# Patient Record
Sex: MALE | URBAN - METROPOLITAN AREA
[De-identification: names, ages, dates, MRNs, and addresses within clinical notes are randomized per-mention and may not be internally consistent; named-entity substitution may affect disease eponyms.]

---

## 2023-08-30 ENCOUNTER — LAB REQUISITION (OUTPATIENT)
Dept: LAB | Facility: CLINIC | Age: 88
End: 2023-08-30

## 2023-08-30 DIAGNOSIS — I48.0 PAROXYSMAL ATRIAL FIBRILLATION (H): ICD-10-CM

## 2023-08-31 LAB — INR PPP: 2.57 (ref 0.85–1.15)

## 2023-08-31 PROCEDURE — 36415 COLL VENOUS BLD VENIPUNCTURE: CPT | Performed by: INTERNAL MEDICINE

## 2023-08-31 PROCEDURE — P9604 ONE-WAY ALLOW PRORATED TRIP: HCPCS | Performed by: INTERNAL MEDICINE

## 2023-08-31 PROCEDURE — 85610 PROTHROMBIN TIME: CPT | Performed by: INTERNAL MEDICINE

## 2023-09-01 ENCOUNTER — LAB REQUISITION (OUTPATIENT)
Dept: LAB | Facility: CLINIC | Age: 88
End: 2023-09-01
Payer: MEDICARE

## 2023-09-01 DIAGNOSIS — I50.33 ACUTE ON CHRONIC DIASTOLIC (CONGESTIVE) HEART FAILURE (H): ICD-10-CM

## 2023-09-02 LAB
ANION GAP SERPL CALCULATED.3IONS-SCNC: 7 MMOL/L (ref 7–15)
BUN SERPL-MCNC: 28.7 MG/DL (ref 8–23)
CALCIUM SERPL-MCNC: 9.1 MG/DL (ref 8.8–10.2)
CHLORIDE SERPL-SCNC: 101 MMOL/L (ref 98–107)
CREAT SERPL-MCNC: 1.2 MG/DL (ref 0.67–1.17)
DEPRECATED HCO3 PLAS-SCNC: 31 MMOL/L (ref 22–29)
GFR SERPL CREATININE-BSD FRML MDRD: 58 ML/MIN/1.73M2
GLUCOSE SERPL-MCNC: 87 MG/DL (ref 70–99)
POTASSIUM SERPL-SCNC: 4.5 MMOL/L (ref 3.4–5.3)
SODIUM SERPL-SCNC: 139 MMOL/L (ref 136–145)

## 2023-09-02 PROCEDURE — P9604 ONE-WAY ALLOW PRORATED TRIP: HCPCS | Performed by: INTERNAL MEDICINE

## 2023-09-02 PROCEDURE — 80048 BASIC METABOLIC PNL TOTAL CA: CPT | Performed by: INTERNAL MEDICINE

## 2023-09-03 ENCOUNTER — LAB REQUISITION (OUTPATIENT)
Dept: LAB | Facility: CLINIC | Age: 88
End: 2023-09-03
Payer: MEDICARE

## 2023-09-03 DIAGNOSIS — I48.0 PAROXYSMAL ATRIAL FIBRILLATION (H): ICD-10-CM

## 2023-09-04 ENCOUNTER — TELEPHONE (OUTPATIENT)
Dept: GERIATRICS | Facility: CLINIC | Age: 88
End: 2023-09-04
Payer: MEDICARE

## 2023-09-04 ENCOUNTER — LAB REQUISITION (OUTPATIENT)
Dept: LAB | Facility: CLINIC | Age: 88
End: 2023-09-04

## 2023-09-04 DIAGNOSIS — R30.9 PAINFUL MICTURITION, UNSPECIFIED: ICD-10-CM

## 2023-09-04 DIAGNOSIS — R35.0 FREQUENCY OF MICTURITION: ICD-10-CM

## 2023-09-04 LAB
ALBUMIN UR-MCNC: 50 MG/DL
APPEARANCE UR: ABNORMAL
BACTERIA #/AREA URNS HPF: ABNORMAL /HPF
BILIRUB UR QL STRIP: NEGATIVE
CAOX CRY #/AREA URNS HPF: ABNORMAL /HPF
COLOR UR AUTO: YELLOW
GLUCOSE UR STRIP-MCNC: NEGATIVE MG/DL
HGB UR QL STRIP: ABNORMAL
HYALINE CASTS: 2 /LPF
KETONES UR STRIP-MCNC: NEGATIVE MG/DL
LEUKOCYTE ESTERASE UR QL STRIP: ABNORMAL
NITRATE UR QL: NEGATIVE
PH UR STRIP: 5 [PH] (ref 5–7)
RBC URINE: 109 /HPF
SP GR UR STRIP: 1.03 (ref 1–1.03)
UROBILINOGEN UR STRIP-MCNC: 2 MG/DL
WBC CLUMPS #/AREA URNS HPF: PRESENT /HPF
WBC URINE: >182 /HPF

## 2023-09-04 PROCEDURE — 81001 URINALYSIS AUTO W/SCOPE: CPT

## 2023-09-04 PROCEDURE — 87186 SC STD MICRODIL/AGAR DIL: CPT

## 2023-09-04 NOTE — TELEPHONE ENCOUNTER
Nursing called for couple of reasons:     Weight gain up >3 lbs.  Ok to give another Lasix 10mg with the second 10mg order of Lasix for the day given this afternoon.  No other changes.  2.  Complaining of burning with urination.  Ok to collect a UA/UC      DEONNA Burden CNP

## 2023-09-05 ENCOUNTER — TRANSITIONAL CARE UNIT VISIT (OUTPATIENT)
Dept: GERIATRICS | Facility: CLINIC | Age: 88
End: 2023-09-05
Payer: MEDICARE

## 2023-09-05 ENCOUNTER — LAB REQUISITION (OUTPATIENT)
Dept: LAB | Facility: CLINIC | Age: 88
End: 2023-09-05
Payer: MEDICARE

## 2023-09-05 VITALS
HEART RATE: 61 BPM | TEMPERATURE: 98.9 F | SYSTOLIC BLOOD PRESSURE: 150 MMHG | HEIGHT: 71 IN | OXYGEN SATURATION: 91 % | DIASTOLIC BLOOD PRESSURE: 84 MMHG | RESPIRATION RATE: 20 BRPM | WEIGHT: 183 LBS | BODY MASS INDEX: 25.62 KG/M2

## 2023-09-05 DIAGNOSIS — S12.100D CLOSED ODONTOID FRACTURE WITH ROUTINE HEALING, SUBSEQUENT ENCOUNTER: ICD-10-CM

## 2023-09-05 DIAGNOSIS — E05.90 HYPERTHYROIDISM: ICD-10-CM

## 2023-09-05 DIAGNOSIS — J96.11 CHRONIC RESPIRATORY FAILURE WITH HYPOXIA (H): ICD-10-CM

## 2023-09-05 DIAGNOSIS — I50.9 ACUTE CONGESTIVE HEART FAILURE, UNSPECIFIED HEART FAILURE TYPE (H): Primary | ICD-10-CM

## 2023-09-05 DIAGNOSIS — I48.0 PAROXYSMAL ATRIAL FIBRILLATION (H): ICD-10-CM

## 2023-09-05 DIAGNOSIS — I10 ESSENTIAL HYPERTENSION: ICD-10-CM

## 2023-09-05 LAB — INR PPP: 3.79 (ref 0.85–1.15)

## 2023-09-05 PROCEDURE — 85610 PROTHROMBIN TIME: CPT | Performed by: FAMILY MEDICINE

## 2023-09-05 PROCEDURE — P9604 ONE-WAY ALLOW PRORATED TRIP: HCPCS | Performed by: FAMILY MEDICINE

## 2023-09-05 PROCEDURE — 99305 1ST NF CARE MODERATE MDM 35: CPT | Performed by: FAMILY MEDICINE

## 2023-09-05 PROCEDURE — 36415 COLL VENOUS BLD VENIPUNCTURE: CPT | Performed by: FAMILY MEDICINE

## 2023-09-05 NOTE — LETTER
9/5/2023        RE: Zachery Jimenez  [address] [address] [address]          [facility] GERIATRICS  [place] Medical Record Number:  [id number]  Place of Service where encounter took place: [facility] ([facility]) [[id number]]  CODE STATUS:   DNR only    Chief Complaint/Reason for Visit:  Chief Complaint   Patient presents with     Hospital F/U     MD note to TCU-CHF exacerbation. Chronic home O2. Recent fall with odontoid fracture, non operative. Afib on coumadin.        HPI:    Zachery Jimenez is a 88 year old male with hx of CHF, chronic resp failure on home O2, afib on coumadin, anxiety, admitted to the hospital on 8/24/2023 with fluid overload and CHF exac following prior hospitalization after a fall with odontoid Fx, non operative.     HOSPITALIST DISCHARGE SUMMARY   [facility]   Admission Date: 8/24/2023  Discharge Date: 8/30/2023    Hospital Course   Mr. Zachery Jimenez is a 88 y.o. male with a history of paroxysmal atrial fibrillation chronically anticoagulated with Coumadin, pulm HTN, on 5L home O2 at baseline, obstructive sleep apnea, tachybradycardia syndrome status post pacemaker placement admitted with acute on chronic diastolic CHF exacerbation.    Fluid overload  Hypoxia  Acute on chronic diastolic CHF  Chronic pulmonary hypertension   Restrictive lung disease due to kyphosis  Presented with hypoxia/shortness of breath.   In ED found to have proBNP 6982,   Chest x-ray with pulmonary vascular congestion/interstitial pulmonary opacities.   Received 80 mg intravenous Lasix with a 1 L urine output.  Patient is currently living at home, receiving home health care, family staying 24/7, past 2 to 3 days noted to be a little more short of breath, possibly some lightheadedness.     He was initially started on IV lasix 20 mg IV every 8 hours. Low blood pressure levels with the diuresis and we held the lasix IV and oral.  Good UOP but low blood pressure levels  8/25/2023.  Pro BNP 8/26/2023 showed improvement.  Discharged on Lasix 10 mg twice daily.  Cr stable in 1.1-1.2 range.  Dry wt around 81 kg.    Acute on chronic hypoxia  Due to the CHF plus pulmonary hypertension and chronic lung disease  Note: Patient was admitted to [facility] from 8/17-8/19 after a fall, found to have type II odontoid fracture along with a chronic appearing 20% C7 compression fracture. He was diuresed for acute pulmonary edema/fluid overload, was sent home with a Lasix 20 mg daily. He had episodes of delirium likely related to narcotics. Became dysphoric, disoriented, agitated with Dilaudid. Daughter had concerns about use of narcotics for pain control/sedatives/confusion in the hospital.  Weaned karla to usual home O2 regimen (up to 6L).    Atrial fibrillation  We continued the coumadin     Overall stabilized and discharged to TCU on 8/30/2023 for PT, OT, nursing cares, medical management and monitoring.     Today:  Wearing hard Aspen C collar, will need follow up with neurosurgery. Has chronic O2 as at home, reports breathing at baseline. No chest pain or dizziness. On lasix 10 mg BID. Has other cardiac meds, isosorbide, metoprolol, amiodarone. Anticoagulated with coumadin for Afib, adequate rate control. Has follow up appt in CHF clinic in October. Reports pretty good appetite. No diarrhea or constipation. No nausea or vomiting. Hx BPH on finasteride, no new urinary sx. Lives at home with a son who was born with hydrocephalus. Wife passed away about 3 weeks ago. Has other kids nearby.       REVIEW OF SYSTEMS:  All others negative other than those noted in HPI.      PAST MEDICAL HISTORY:  Past Medical History:   Diagnosis Date     Afib (H)      Anxiety      Congestive heart failure (H)      Hyperlipidemia      Hypertension      Peripheral autonomic neuropathy in disorders classified elsewhere        PAST SURGICAL HISTORY:  Past Surgical History:   Procedure Laterality Date     CATARACT  EXTRACTION Bilateral        FAMILY HISTORY:  Family History   Problem Relation Age of Onset     Heart Disease Mother      Heart Disease Father      Diabetes Brother        SOCIAL HISTORY:  Social History     Socioeconomic History     Marital status:      Spouse name: Not on file     Number of children: Not on file     Years of education: Not on file     Highest education level: Not on file   Occupational History     Not on file   Tobacco Use     Smoking status: Not on file     Smokeless tobacco: Not on file   Substance and Sexual Activity     Alcohol use: Not on file     Drug use: Not on file     Sexual activity: Not on file   Other Topics Concern     Not on file   Social History Narrative     Not on file     Social Determinants of Health     Financial Resource Strain: Not on file   Food Insecurity: Not on file   Transportation Needs: Not on file   Physical Activity: Not on file   Stress: Not on file   Social Connections: Not on file   Intimate Partner Violence: Not on file   Housing Stability: Not on file       MEDICATIONS:  Current Outpatient Medications   Medication Sig Dispense Refill     acetaminophen (TYLENOL) 500 MG tablet Take 1,000 mg by mouth every 8 hours       amiodarone (PACERONE) 200 MG tablet Take 200 mg by mouth daily       atorvastatin (LIPITOR) 20 MG tablet Take 20 mg by mouth daily       docusate sodium (COLACE) 100 MG capsule Take 100 mg by mouth daily       finasteride (PROSCAR) 5 MG tablet Take 5 mg by mouth daily       furosemide (LASIX) 20 MG tablet Take 10 mg by mouth 2 times daily       hydrOXYzine (ATARAX) 10 MG tablet Take 10 mg by mouth every 6 hours as needed       isosorbide mononitrate (IMDUR) 30 MG 24 hr tablet Take 30 mg by mouth daily       Lidocaine (LIDOCARE) 4 % Patch Apply to intact skin to cover most painful area for max 12hr per 24hr period.       methimazole (TAPAZOLE) 5 MG tablet Take 1 tablet by mouth daily       metoprolol succinate ER (TOPROL XL) 50 MG 24 hr  "tablet Take 25 mg by mouth daily       multiple vitamin  tablet TABS Take 1 tablet by mouth 2 times daily       nitroGLYcerin (NITROSTAT) 0.4 MG sublingual tablet Place 0.4 mg under the tongue       polyethylene glycol (MIRALAX) 17 g packet 17 g by Nasogastric route       sertraline (ZOLOFT) 50 MG tablet Take 1 tablet by mouth daily       warfarin ANTICOAGULANT (COUMADIN) 2.5 MG tablet          ALLERGIES:  Allergies   Allergen Reactions     Beta Adrenergic Blockers Other (See Comments)     ALPHA 2 ADRENERGIC AGONIST        PHYSICAL EXAM:  General: Patient is alert male, no distress. On O2. [place].   Vitals: BP (!) 150/84   Pulse 61   Temp 98.9  F (37.2  C)   Resp 20   Ht 1.803 m (5' 11\")   Wt 83 kg (183 lb)   SpO2 91%   BMI 25.52 kg/m    HEENT: Head is NCAT. Eyes show no injection or icterus. Nares negative. Oropharynx well hydrated.  Neck: Aspen collar.   Lungs: Diminished at bases. No wheezes.  Cardiovascular: Regular rate and rhythm, normal S1. S2.  Back: No spinal or CVA tenderness.  Abdomen: Soft, no tenderness on exam. Bowel sounds present. No guarding rebound or rigidity.  : Deferred.  Extremities: Trace edema is noted.  Musculoskeletal: Age related degen changes.   Skin: No rases.   Psych: Mood appears pretty good.      LABS/DIAGNOSTIC DATA:  Ref Range & Units  8/24/2023    SODIUM 136 - 145 mmol/L 138   POTASSIUM 3.5 - 5.1 mmol/L 5.0   CHLORIDE 98 - 107 mmol/L 101   CO2,TOTAL 22 - 29 mmol/L 26   ANION GAP 5 - 18 11   GLUCOSE 70 - 99 mg/dL 102 High    CALCIUM 8.8 - 10.2 mg/dL 8.9   BUN 8 - 23 mg/dL 26 High    CREATININE 0.70 - 1.20 mg/dL 1.16   BUN/CREAT RATIO 10 - 20 22 High    eGFR >90 mL/min/1.73m2 61 Low      Ref Range & Units  8/24/2023    WHITE BLOOD COUNT         4.5 - 11.0 thou/cu mm 6.5   RED BLOOD COUNT           4.30 - 5.90 mil/cu mm 4.01 Low    HEMOGLOBIN               13.5 - 17.5 g/dL 13.2 Low    HEMATOCRIT               37.0 - 53.0 % 41.2   MCV                       80 - 100 fL 103 High  "   MCH                       26.0 - 34.0 pg 32.9   MCHC                     32.0 - 36.0 g/dL 32.0   RDW                       11.5 - 15.5 % 15.8 High    PLATELET COUNT           140 - 440 thou/cu mm 203   MPV                       6.5 - 11.0 fL 9.9   NRBC                     % 0.0   ABS NRBC thou /cu mm 0.0       ASSESSMENT/PLAN:  CHF. Acute on chronic. Diuresed in the hospital, continuing on PO lasix. Also on isosorbide. Monitor weights, edema, clinical status. Follow up in CHF clinic in October.  Chronic resp failure. CHF plus pulm HTN, ASHLIE and restrictive lung disease. On home oxygen up to 6 LPM, continue.  Afib. Anticoagulated with coumadin. On amiodarone and metoprolol for rate control.  Odontoid fracture. Sustained in a fall approx 8/15/2023, seen by neurosurgery, non operative. Follow up in neurosurgeon office. Continue Aspen collar. Pain management adequate, has scheduled tylenol.   HTN. On metoprolol and lasix. Monitor Bps in TCU.   Hyperthyroidism. He is on methimazole.  Anxiety. Continue PTA Sertraline.   BPH. On finasteride.   Code status is DNR.         Electronically signed by: Millie Mejia MD               Sincerely,        Millie Mejia MD

## 2023-09-06 ENCOUNTER — LAB REQUISITION (OUTPATIENT)
Dept: LAB | Facility: CLINIC | Age: 88
End: 2023-09-06
Payer: MEDICARE

## 2023-09-06 DIAGNOSIS — I48.0 PAROXYSMAL ATRIAL FIBRILLATION (H): ICD-10-CM

## 2023-09-06 LAB
BACTERIA UR CULT: ABNORMAL
INR PPP: 3.05 (ref 0.85–1.15)

## 2023-09-06 PROCEDURE — 36415 COLL VENOUS BLD VENIPUNCTURE: CPT | Performed by: FAMILY MEDICINE

## 2023-09-06 PROCEDURE — 85610 PROTHROMBIN TIME: CPT | Performed by: FAMILY MEDICINE

## 2023-09-06 PROCEDURE — P9604 ONE-WAY ALLOW PRORATED TRIP: HCPCS | Performed by: FAMILY MEDICINE

## 2023-09-06 RX ORDER — POLYETHYLENE GLYCOL 3350 17 G/17G
17 POWDER, FOR SOLUTION ORAL
COMMUNITY
Start: 2023-08-19

## 2023-09-06 RX ORDER — WARFARIN SODIUM 2.5 MG/1
TABLET ORAL
COMMUNITY
Start: 2023-06-05

## 2023-09-06 RX ORDER — METOPROLOL SUCCINATE 50 MG/1
25 TABLET, EXTENDED RELEASE ORAL DAILY
COMMUNITY
Start: 2023-03-28

## 2023-09-06 RX ORDER — MULTIVITAMIN
1 TABLET ORAL 2 TIMES DAILY
COMMUNITY

## 2023-09-06 RX ORDER — ATORVASTATIN CALCIUM 20 MG/1
20 TABLET, FILM COATED ORAL DAILY
COMMUNITY
Start: 2023-01-30

## 2023-09-06 RX ORDER — LIDOCAINE 4 G/G
PATCH TOPICAL
COMMUNITY
Start: 2023-08-20

## 2023-09-06 RX ORDER — METHIMAZOLE 5 MG/1
1 TABLET ORAL DAILY
COMMUNITY
Start: 2023-06-30

## 2023-09-06 RX ORDER — ACETAMINOPHEN 500 MG
1000 TABLET ORAL EVERY 8 HOURS
COMMUNITY

## 2023-09-06 RX ORDER — NITROGLYCERIN 0.4 MG/1
0.4 TABLET SUBLINGUAL
COMMUNITY
Start: 2023-08-02

## 2023-09-06 RX ORDER — FUROSEMIDE 20 MG
10 TABLET ORAL 2 TIMES DAILY
COMMUNITY

## 2023-09-06 RX ORDER — AMIODARONE HYDROCHLORIDE 200 MG/1
200 TABLET ORAL DAILY
COMMUNITY
Start: 2023-05-02

## 2023-09-06 RX ORDER — ISOSORBIDE MONONITRATE 30 MG/1
30 TABLET, EXTENDED RELEASE ORAL DAILY
COMMUNITY
Start: 2023-08-30

## 2023-09-06 RX ORDER — FINASTERIDE 5 MG/1
5 TABLET, FILM COATED ORAL DAILY
COMMUNITY
Start: 2023-02-25

## 2023-09-06 RX ORDER — DOCUSATE SODIUM 100 MG/1
100 CAPSULE, LIQUID FILLED ORAL DAILY
COMMUNITY

## 2023-09-06 RX ORDER — HYDROXYZINE HYDROCHLORIDE 10 MG/1
10 TABLET, FILM COATED ORAL EVERY 6 HOURS PRN
COMMUNITY
Start: 2023-08-02 | End: 2023-09-20

## 2023-09-07 ENCOUNTER — TELEPHONE (OUTPATIENT)
Dept: GERIATRICS | Facility: CLINIC | Age: 88
End: 2023-09-07

## 2023-09-07 ENCOUNTER — TRANSITIONAL CARE UNIT VISIT (OUTPATIENT)
Dept: GERIATRICS | Facility: CLINIC | Age: 88
End: 2023-09-07
Payer: MEDICARE

## 2023-09-07 VITALS
SYSTOLIC BLOOD PRESSURE: 101 MMHG | WEIGHT: 184.5 LBS | OXYGEN SATURATION: 92 % | HEIGHT: 71 IN | HEART RATE: 71 BPM | RESPIRATION RATE: 18 BRPM | BODY MASS INDEX: 25.83 KG/M2 | TEMPERATURE: 98.2 F | DIASTOLIC BLOOD PRESSURE: 47 MMHG

## 2023-09-07 DIAGNOSIS — I50.33 ACUTE ON CHRONIC HEART FAILURE WITH PRESERVED EJECTION FRACTION (HFPEF) (H): Primary | ICD-10-CM

## 2023-09-07 DIAGNOSIS — J96.21 ACUTE ON CHRONIC RESPIRATORY FAILURE WITH HYPOXIA (H): ICD-10-CM

## 2023-09-07 DIAGNOSIS — I48.0 PAROXYSMAL ATRIAL FIBRILLATION (H): ICD-10-CM

## 2023-09-07 DIAGNOSIS — I10 ESSENTIAL HYPERTENSION: ICD-10-CM

## 2023-09-07 DIAGNOSIS — S12.100D CLOSED ODONTOID FRACTURE WITH ROUTINE HEALING, SUBSEQUENT ENCOUNTER: ICD-10-CM

## 2023-09-07 LAB — INR PPP: 3.23 (ref 0.85–1.15)

## 2023-09-07 PROCEDURE — P9604 ONE-WAY ALLOW PRORATED TRIP: HCPCS | Performed by: NURSE PRACTITIONER

## 2023-09-07 PROCEDURE — 85610 PROTHROMBIN TIME: CPT | Performed by: NURSE PRACTITIONER

## 2023-09-07 PROCEDURE — 99309 SBSQ NF CARE MODERATE MDM 30: CPT | Performed by: FAMILY MEDICINE

## 2023-09-07 PROCEDURE — 36415 COLL VENOUS BLD VENIPUNCTURE: CPT | Performed by: NURSE PRACTITIONER

## 2023-09-07 NOTE — TELEPHONE ENCOUNTER
[facility] Geriatrics Triage Nurse INR     Provider: NORI Rodriguez  Facility: [facility]  Facility Type:  TCU    Caller: Belén  Call Back Number: [phone]  Reason for call: INR  Diagnosis/Goal: A. Fib    Todays INR: 3.23  Last INR 9/6  3.05  1mg  9/5  3.79  held  8/31  2.57  1.25mg F and 2.5mg AOD.     Heparin/Lovenox:  No  Currently on ABX?: Yes; please explain: Keflex 9/13  Other interacting medication:  None  Missed or refused doses: No    Verbal Order/Direction given by Provider:   0.5mg tonight, 1mg aod. recheck Monday 9/11    Provider Giving Order:  NORI Rodriguez    Verbal Order given to: Belén Sommer RN

## 2023-09-07 NOTE — LETTER
9/7/2023        RE: Zachery Jimenez  [address] [address] [address]          [facility] GERIATRICS  [place] Medical Record Number:  [id number]  Place of Service where encounter took place: [facility] ([facility]) [[id number]]  CODE STATUS:   DNR only    Chief Complaint/Reason for Visit:  Chief Complaint   Patient presents with     RECHECK     TCU 9/7/2023. CHF exacerbation. Chronic home O2. Recent fall with odontoid fracture, non operative. Afib on coumadin.        TCU HPI:    Zachery Jimenez is a 88 year old male with hx of CHF, chronic resp failure on home O2, afib on coumadin, anxiety, admitted to the hospital on 8/24/2023 with fluid overload and CHF exac following prior hospitalization after a fall with odontoid Fx, non operative.     HOSPITALIST DISCHARGE SUMMARY   [facility]   Admission Date: 8/24/2023  Discharge Date: 8/30/2023    Hospital Course   Mr. Zachery Jimenez is a 88 y.o. male with a history of paroxysmal atrial fibrillation chronically anticoagulated with Coumadin, pulm HTN, on 5L home O2 at baseline, obstructive sleep apnea, tachybradycardia syndrome status post pacemaker placement admitted with acute on chronic diastolic CHF exacerbation.    Fluid overload  Hypoxia  Acute on chronic diastolic CHF  Chronic pulmonary hypertension   Restrictive lung disease due to kyphosis  Presented with hypoxia/shortness of breath.   In ED found to have proBNP 6982,   Chest x-ray with pulmonary vascular congestion/interstitial pulmonary opacities.   Received 80 mg intravenous Lasix with a 1 L urine output.  Patient is currently living at home, receiving home health care, family staying 24/7, past 2 to 3 days noted to be a little more short of breath, possibly some lightheadedness.     He was initially started on IV lasix 20 mg IV every 8 hours. Low blood pressure levels with the diuresis and we held the lasix IV and oral.  Good UOP but low blood pressure levels  8/25/2023.  Pro BNP 8/26/2023 showed improvement.  Discharged on Lasix 10 mg twice daily.  Cr stable in 1.1-1.2 range.  Dry wt around 81 kg.    Acute on chronic hypoxia  Due to the CHF plus pulmonary hypertension and chronic lung disease  Note: Patient was admitted to [facility] from 8/17-8/19 after a fall, found to have type II odontoid fracture along with a chronic appearing 20% C7 compression fracture. He was diuresed for acute pulmonary edema/fluid overload, was sent home with a Lasix 20 mg daily. He had episodes of delirium likely related to narcotics. Became dysphoric, disoriented, agitated with Dilaudid. Daughter had concerns about use of narcotics for pain control/sedatives/confusion in the hospital.  Weaned karla to usual home O2 regimen (up to 6L).    Atrial fibrillation  We continued the coumadin     Overall stabilized and discharged to TCU on 8/30/2023 for PT, OT, nursing cares, medical management and monitoring.     Today:  Wearing hard Aspen C collar, needs follow up with neurosurgery. States no pain in neck currently. Reports breathing is at baseline, chronic O2 per NC. No chest pain or dizziness. On lasix 10 mg BID. Has other cardiac meds, isosorbide, metoprolol, amiodarone. Anticoagulated with coumadin for Afib, adequate rate control. Has follow up appt in CHF clinic in October. Appetite is good. No diarrhea or constipation. No nausea or vomiting. Hx BPH on finasteride.      PAST MEDICAL HISTORY:  Past Medical History:   Diagnosis Date     Afib (H)      Anxiety      Congestive heart failure (H)      Hyperlipidemia      Hypertension      Peripheral autonomic neuropathy in disorders classified elsewhere        MEDICATIONS:  Current Outpatient Medications   Medication Sig Dispense Refill     acetaminophen (TYLENOL) 500 MG tablet Take 1,000 mg by mouth every 8 hours       amiodarone (PACERONE) 200 MG tablet Take 200 mg by mouth daily       atorvastatin (LIPITOR) 20 MG tablet Take 20 mg by mouth daily   "     docusate sodium (COLACE) 100 MG capsule Take 100 mg by mouth daily       finasteride (PROSCAR) 5 MG tablet Take 5 mg by mouth daily       furosemide (LASIX) 20 MG tablet Take 10 mg by mouth 2 times daily       hydrOXYzine (ATARAX) 10 MG tablet Take 10 mg by mouth every 6 hours as needed       isosorbide mononitrate (IMDUR) 30 MG 24 hr tablet Take 30 mg by mouth daily       Lidocaine (LIDOCARE) 4 % Patch Apply to intact skin to cover most painful area for max 12hr per 24hr period.       methimazole (TAPAZOLE) 5 MG tablet Take 1 tablet by mouth daily       metoprolol succinate ER (TOPROL XL) 50 MG 24 hr tablet Take 25 mg by mouth daily       multiple vitamin  tablet TABS Take 1 tablet by mouth 2 times daily       nitroGLYcerin (NITROSTAT) 0.4 MG sublingual tablet Place 0.4 mg under the tongue       polyethylene glycol (MIRALAX) 17 g packet 17 g by Nasogastric route       sertraline (ZOLOFT) 50 MG tablet Take 1 tablet by mouth daily       warfarin ANTICOAGULANT (COUMADIN) 2.5 MG tablet          PHYSICAL EXAM:  General: Patient is alert male, no distress. On O2. [place].   Vitals: /47   Pulse 71   Temp 98.2  F (36.8  C)   Resp 18   Ht 1.803 m (5' 11\")   Wt 83.7 kg (184 lb 8 oz)   SpO2 92%   BMI 25.73 kg/m    HEENT: Head is NCAT. Eyes show no injection or icterus. Nares negative. Oropharynx well hydrated.  Neck: Aspen collar.   Lungs: Nonlabored respirations.   : Deferred.  Extremities: Minimal edema.  Musculoskeletal: Age related degen changes.   Skin: Warm and dry.   Psych: Mood is good.         LABS/DIAGNOSTIC DATA:  Ref Range & Units  8/24/2023    SODIUM 136 - 145 mmol/L 138   POTASSIUM 3.5 - 5.1 mmol/L 5.0   CHLORIDE 98 - 107 mmol/L 101   CO2,TOTAL 22 - 29 mmol/L 26   ANION GAP 5 - 18 11   GLUCOSE 70 - 99 mg/dL 102 High    CALCIUM 8.8 - 10.2 mg/dL 8.9   BUN 8 - 23 mg/dL 26 High    CREATININE 0.70 - 1.20 mg/dL 1.16   BUN/CREAT RATIO 10 - 20 22 High    eGFR >90 mL/min/1.73m2 61 Low      Ref Range & " Units  8/24/2023    WHITE BLOOD COUNT         4.5 - 11.0 thou/cu mm 6.5   RED BLOOD COUNT           4.30 - 5.90 mil/cu mm 4.01 Low    HEMOGLOBIN               13.5 - 17.5 g/dL 13.2 Low    HEMATOCRIT               37.0 - 53.0 % 41.2   MCV                       80 - 100 fL 103 High    MCH                       26.0 - 34.0 pg 32.9   MCHC                     32.0 - 36.0 g/dL 32.0   RDW                       11.5 - 15.5 % 15.8 High    PLATELET COUNT           140 - 440 thou/cu mm 203   MPV                       6.5 - 11.0 fL 9.9   NRBC                     % 0.0   ABS NRBC thou /cu mm 0.0       ASSESSMENT/PLAN:  CHF. Acute on chronic. Diuresed in the hospital, continuing on PO lasix. Also on isosorbide. Monitor weights, edema, clinical status. Follow up in CHF clinic in October.  Chronic resp failure. CHF plus pulm HTN, ASHLIE and restrictive lung disease. On home oxygen up to 6 LPM, continue. Resp status at baseline.   Afib. Anticoagulated with coumadin. On amiodarone and metoprolol for rate control.  Odontoid fracture. Sustained in a fall approx 8/15/2023, seen by neurosurgery, non operative. Follow up in neurosurgeon office. Continue Aspen collar.   HTN. On metoprolol and lasix. Monitor Bps in TCU.        Electronically signed by: Millie Mejia MD             Sincerely,        Millie Mejia MD

## 2023-09-10 ENCOUNTER — LAB REQUISITION (OUTPATIENT)
Dept: LAB | Facility: CLINIC | Age: 88
End: 2023-09-10
Payer: MEDICARE

## 2023-09-10 DIAGNOSIS — I48.0 PAROXYSMAL ATRIAL FIBRILLATION (H): ICD-10-CM

## 2023-09-11 ENCOUNTER — TRANSITIONAL CARE UNIT VISIT (OUTPATIENT)
Dept: GERIATRICS | Facility: CLINIC | Age: 88
End: 2023-09-11
Payer: MEDICARE

## 2023-09-11 DIAGNOSIS — I50.32 CHRONIC DIASTOLIC CHF (CONGESTIVE HEART FAILURE) (H): Primary | ICD-10-CM

## 2023-09-11 DIAGNOSIS — S12.100S CLOSED ODONTOID FRACTURE, SEQUELA: ICD-10-CM
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
## 2023-09-11 LAB — INR PPP: 1.9 (ref 0.85–1.15)

## 2023-09-11 PROCEDURE — P9604 ONE-WAY ALLOW PRORATED TRIP: HCPCS | Performed by: NURSE PRACTITIONER

## 2023-09-11 PROCEDURE — 85610 PROTHROMBIN TIME: CPT | Performed by: NURSE PRACTITIONER

## 2023-09-11 PROCEDURE — 99309 SBSQ NF CARE MODERATE MDM 30: CPT | Performed by: NURSE PRACTITIONER

## 2023-09-11 PROCEDURE — 36415 COLL VENOUS BLD VENIPUNCTURE: CPT | Performed by: NURSE PRACTITIONER

## 2023-09-11 NOTE — LETTER
"    9/11/2023        RE: Zachery Jimenez  [address] [address] [address]        [facility] GERIATRICS    PRIMARY CARE PROVIDER AND CLINIC:  Provider Outside, No address on file  Chief Complaint   Patient presents with     Hospital F/U      [place] Medical Record Number:  [id number]  Place of Service where encounter took place:  [facility] ([facility]) [[id number]]    Zachery Jimenez  is a 88 year old  (11/18/1934)  HPI:  PMH of afib on coumadin, pulm htn on 5L o2, ASHLIE, pacemaker insitu, chronic HFrEF who was hospitalized 8/24-8/40 at [facility] for acute CHF exacerbation, fluid overload and hypoxia. He was started on IV lasix with improvement in fluid status. He was also hospitalized in early August for similar symptoms and sent home. At that time he had had a fall with odontoid fx and c7 compression fx and is now in aspen c collar for 12 weeks.        Today:  He is seen today per request due to 10lb weight gain since entering TCU. Reported \"dry weight\" is around 178lbs per hospital. He is now 187. He has no LE edema, no increased abdominal girth, and his SOB/HORN is improved as compared to when he arrived at the TCU. He is on his baseline 5L of 02 via NC and is speaking clear sentences. He states that when he works with therapy, he feels well and usually sits half way through to catch his breath, this is baseline. He remains on lasix 10 bid. Follow up in CHF clinic in October is scheduled.   During the visit he explains that his son, [place], who has chronic health conditions is just down the [place] here. He lives with him. He is worried about getting care for himself and for his son. He does have other sons and a daughter who is involved.       CODE STATUS/ADVANCE DIRECTIVES DISCUSSION:  No Order  DNR / DNI  ALLERGIES:   Allergies   Allergen Reactions     Beta Adrenergic Blockers Other (See Comments)     ALPHA 2 ADRENERGIC AGONIST       PAST MEDICAL HISTORY:   Past Medical History:   Diagnosis " Date     Afib (H)      Anxiety      Congestive heart failure (H)      Hyperlipidemia      Hypertension      Peripheral autonomic neuropathy in disorders classified elsewhere       PAST SURGICAL HISTORY:   has a past surgical history that includes Cataract Extraction (Bilateral).  FAMILY HISTORY: family history includes Diabetes in his brother; Heart Disease in his father and mother.  SOCIAL HISTORY:     Patient's living condition: lives with family, Son, [place].      Post Discharge Medication Reconciliation Status:   MED REC REQUIRED{  Post Medication Reconciliation Status:  Discharge medications reconciled, continue medications without change       Current Outpatient Medications   Medication Sig     acetaminophen (TYLENOL) 500 MG tablet Take 1,000 mg by mouth every 8 hours     amiodarone (PACERONE) 200 MG tablet Take 200 mg by mouth daily     atorvastatin (LIPITOR) 20 MG tablet Take 20 mg by mouth daily     docusate sodium (COLACE) 100 MG capsule Take 100 mg by mouth daily     finasteride (PROSCAR) 5 MG tablet Take 5 mg by mouth daily     furosemide (LASIX) 20 MG tablet Take 10 mg by mouth 2 times daily     isosorbide mononitrate (IMDUR) 30 MG 24 hr tablet Take 30 mg by mouth daily     Lidocaine (LIDOCARE) 4 % Patch Apply to intact skin to cover most painful area for max 12hr per 24hr period.     methimazole (TAPAZOLE) 5 MG tablet Take 1 tablet by mouth daily     metoprolol succinate ER (TOPROL XL) 50 MG 24 hr tablet Take 25 mg by mouth daily     multiple vitamin  tablet TABS Take 1 tablet by mouth 2 times daily     nitroGLYcerin (NITROSTAT) 0.4 MG sublingual tablet Place 0.4 mg under the tongue     polyethylene glycol (MIRALAX) 17 g packet 17 g by Nasogastric route     sertraline (ZOLOFT) 50 MG tablet Take 1 tablet by mouth daily     warfarin ANTICOAGULANT (COUMADIN) 2.5 MG tablet      No current facility-administered medications for this visit.       ROS:  4 point ROS including Respiratory, CV, GI and , other  "than that noted in the HPI,  is negative    Vitals:  BP (!) 140/77   Pulse 63   Temp 98.5  F (36.9  C)   Resp 18   Ht 1.803 m (5' 11\")   Wt 85.8 kg (189 lb 3.2 oz)   SpO2 90%   BMI 26.39 kg/m    Exam:  General appearance: alert, cooperative.   Lungs: respirations unlabored,no wheezing or rales.   Cardiovascular: Regular rate and rhythm.   ABDOMEN: Globular and soft, non tender.    Extremities: extremities normal, atraumatic, no cyanosis or edema  Skin: No rashes or lesions  Neurologic: oriented. No focal deficits.   Psych: interacts well with caregivers,  pleasant    Lab/Diagnostic data:  Recent labs in [facility] reviewed by me today.     ASSESSMENT/PLAN:    1. Chronic diastolic CHF (congestive heart failure) (H)    2. Closed odontoid fracture, sequela    3. Fall, sequela      CHF, weights up at 187 from admit weight of 178, however the rest of the weights since entering the tcu have been 184-187. No evidence of fluid overload, no SOB or HORN. Continue lasix 10 bid.   Odontoid fx: continue aspen c collar, f/up with neurosurgery.   Falls: PT and OT for strengthening. Will need close discharge planning to ensure safe discharge.       Electronically signed by:  Cecy Riddle CNP                   Sincerely,        Cecy Riddle CNP      "

## 2023-09-11 NOTE — PROGRESS NOTES
"[facility] GERIATRICS    PRIMARY CARE PROVIDER AND CLINIC:  Provider Outside, No address on file  Chief Complaint   Patient presents with    Hospital F/U      [place] Medical Record Number:  [id number]  Place of Service where encounter took place:  [facility] ([facility]) [[id number]]    Zachery Jimenez  is a 88 year old  (11/18/1934)  HPI:  PMH of afib on coumadin, pulm htn on 5L o2, ASHLIE, pacemaker insitu, chronic HFrEF who was hospitalized 8/24-8/40 at [facility] for acute CHF exacerbation, fluid overload and hypoxia. He was started on IV lasix with improvement in fluid status. He was also hospitalized in early August for similar symptoms and sent home. At that time he had had a fall with odontoid fx and c7 compression fx and is now in aspen c collar for 12 weeks.        Today:  He is seen today per request due to 10lb weight gain since entering TCU. Reported \"dry weight\" is around 178lbs per hospital. He is now 187. He has no LE edema, no increased abdominal girth, and his SOB/HORN is improved as compared to when he arrived at the TCU. He is on his baseline 5L of 02 via NC and is speaking clear sentences. He states that when he works with therapy, he feels well and usually sits half way through to catch his breath, this is baseline. He remains on lasix 10 bid. Follow up in CHF clinic in October is scheduled.   During the visit he explains that his son, [place], who has chronic health conditions is just down the [place] here. He lives with him. He is worried about getting care for himself and for his son. He does have other sons and a daughter who is involved.       CODE STATUS/ADVANCE DIRECTIVES DISCUSSION:  No Order  DNR / DNI  ALLERGIES:   Allergies   Allergen Reactions    Beta Adrenergic Blockers Other (See Comments)     ALPHA 2 ADRENERGIC AGONIST       PAST MEDICAL HISTORY:   Past Medical History:   Diagnosis Date    Afib (H)     Anxiety     Congestive heart failure (H)     Hyperlipidemia     " Hypertension     Peripheral autonomic neuropathy in disorders classified elsewhere       PAST SURGICAL HISTORY:   has a past surgical history that includes Cataract Extraction (Bilateral).  FAMILY HISTORY: family history includes Diabetes in his brother; Heart Disease in his father and mother.  SOCIAL HISTORY:     Patient's living condition: lives with family, Son, [place].      Post Discharge Medication Reconciliation Status:   MED REC REQUIRED{  Post Medication Reconciliation Status:  Discharge medications reconciled, continue medications without change       Current Outpatient Medications   Medication Sig    acetaminophen (TYLENOL) 500 MG tablet Take 1,000 mg by mouth every 8 hours    amiodarone (PACERONE) 200 MG tablet Take 200 mg by mouth daily    atorvastatin (LIPITOR) 20 MG tablet Take 20 mg by mouth daily    docusate sodium (COLACE) 100 MG capsule Take 100 mg by mouth daily    finasteride (PROSCAR) 5 MG tablet Take 5 mg by mouth daily    furosemide (LASIX) 20 MG tablet Take 10 mg by mouth 2 times daily    isosorbide mononitrate (IMDUR) 30 MG 24 hr tablet Take 30 mg by mouth daily    Lidocaine (LIDOCARE) 4 % Patch Apply to intact skin to cover most painful area for max 12hr per 24hr period.    methimazole (TAPAZOLE) 5 MG tablet Take 1 tablet by mouth daily    metoprolol succinate ER (TOPROL XL) 50 MG 24 hr tablet Take 25 mg by mouth daily    multiple vitamin  tablet TABS Take 1 tablet by mouth 2 times daily    nitroGLYcerin (NITROSTAT) 0.4 MG sublingual tablet Place 0.4 mg under the tongue    polyethylene glycol (MIRALAX) 17 g packet 17 g by Nasogastric route    sertraline (ZOLOFT) 50 MG tablet Take 1 tablet by mouth daily    warfarin ANTICOAGULANT (COUMADIN) 2.5 MG tablet      No current facility-administered medications for this visit.       ROS:  4 point ROS including Respiratory, CV, GI and , other than that noted in the HPI,  is negative    Vitals:  BP (!) 140/77   Pulse 63   Temp 98.5  F (36.9  C)    "Resp 18   Ht 1.803 m (5' 11\")   Wt 85.8 kg (189 lb 3.2 oz)   SpO2 90%   BMI 26.39 kg/m    Exam:  General appearance: alert, cooperative.   Lungs: respirations unlabored,no wheezing or rales.   Cardiovascular: Regular rate and rhythm.   ABDOMEN: Globular and soft, non tender.    Extremities: extremities normal, atraumatic, no cyanosis or edema  Skin: No rashes or lesions  Neurologic: oriented. No focal deficits.   Psych: interacts well with caregivers,  pleasant    Lab/Diagnostic data:  Recent labs in [facility] reviewed by me today.     ASSESSMENT/PLAN:    1. Chronic diastolic CHF (congestive heart failure) (H)    2. Closed odontoid fracture, sequela    3. Fall, sequela      CHF, weights up at 187 from admit weight of 178, however the rest of the weights since entering the tcu have been 184-187. No evidence of fluid overload, no SOB or HORN. Continue lasix 10 bid.   Odontoid fx: continue aspen c collar, f/up with neurosurgery.   Falls: PT and OT for strengthening. Will need close discharge planning to ensure safe discharge.       Electronically signed by:  Cecy Riddle CNP                 "

## 2023-09-11 NOTE — PROGRESS NOTES
[facility] GERIATRICS  [place] Medical Record Number:  [id number]  Place of Service where encounter took place: [facility] ([facility]) [[id number]]  CODE STATUS:   DNR only    Chief Complaint/Reason for Visit:  Chief Complaint   Patient presents with    Hospital F/U     MD note to TCU-CHF exacerbation. Chronic home O2. Recent fall with odontoid fracture, non operative. Afib on coumadin.        HPI:    Zachery Jimenez is a 88 year old male with hx of CHF, chronic resp failure on home O2, afib on coumadin, anxiety, admitted to the hospital on 8/24/2023 with fluid overload and CHF exac following prior hospitalization after a fall with odontoid Fx, non operative.     HOSPITALIST DISCHARGE SUMMARY  [facility]   Admission Date: 8/24/2023  Discharge Date: 8/30/2023    Hospital Course   Mr. Zachery Jimenez is a 88 y.o. male with a history of paroxysmal atrial fibrillation chronically anticoagulated with Coumadin, pulm HTN, on 5L home O2 at baseline, obstructive sleep apnea, tachybradycardia syndrome status post pacemaker placement admitted with acute on chronic diastolic CHF exacerbation.    Fluid overload  Hypoxia  Acute on chronic diastolic CHF  Chronic pulmonary hypertension   Restrictive lung disease due to kyphosis  Presented with hypoxia/shortness of breath.   In ED found to have proBNP 6982,   Chest x-ray with pulmonary vascular congestion/interstitial pulmonary opacities.   Received 80 mg intravenous Lasix with a 1 L urine output.  Patient is currently living at home, receiving home health care, family staying 24/7, past 2 to 3 days noted to be a little more short of breath, possibly some lightheadedness.     He was initially started on IV lasix 20 mg IV every 8 hours. Low blood pressure levels with the diuresis and we held the lasix IV and oral.  Good UOP but low blood pressure levels 8/25/2023.  Pro BNP 8/26/2023 showed improvement.  Discharged on Lasix 10 mg twice daily.  Cr  stable in 1.1-1.2 range.  Dry wt around 81 kg.    Acute on chronic hypoxia  Due to the CHF plus pulmonary hypertension and chronic lung disease  Note: Patient was admitted to [facility] from 8/17-8/19 after a fall, found to have type II odontoid fracture along with a chronic appearing 20% C7 compression fracture. He was diuresed for acute pulmonary edema/fluid overload, was sent home with a Lasix 20 mg daily. He had episodes of delirium likely related to narcotics. Became dysphoric, disoriented, agitated with Dilaudid. Daughter had concerns about use of narcotics for pain control/sedatives/confusion in the hospital.  Weaned karla to usual home O2 regimen (up to 6L).    Atrial fibrillation  We continued the coumadin     Overall stabilized and discharged to TCU on 8/30/2023 for PT, OT, nursing cares, medical management and monitoring.     Today:  Wearing hard Aspen C collar, will need follow up with neurosurgery. Has chronic O2 as at home, reports breathing at baseline. No chest pain or dizziness. On lasix 10 mg BID. Has other cardiac meds, isosorbide, metoprolol, amiodarone. Anticoagulated with coumadin for Afib, adequate rate control. Has follow up appt in CHF clinic in October. Reports pretty good appetite. No diarrhea or constipation. No nausea or vomiting. Hx BPH on finasteride, no new urinary sx. Lives at home with a son who was born with hydrocephalus. Wife passed away about 3 weeks ago. Has other kids nearby.       REVIEW OF SYSTEMS:  All others negative other than those noted in HPI.      PAST MEDICAL HISTORY:  Past Medical History:   Diagnosis Date    Afib (H)     Anxiety     Congestive heart failure (H)     Hyperlipidemia     Hypertension     Peripheral autonomic neuropathy in disorders classified elsewhere        PAST SURGICAL HISTORY:  Past Surgical History:   Procedure Laterality Date    CATARACT EXTRACTION Bilateral        FAMILY HISTORY:  Family History   Problem Relation Age of Onset    Heart  Disease Mother     Heart Disease Father     Diabetes Brother        SOCIAL HISTORY:  Social History     Socioeconomic History    Marital status:      Spouse name: Not on file    Number of children: Not on file    Years of education: Not on file    Highest education level: Not on file   Occupational History    Not on file   Tobacco Use    Smoking status: Not on file    Smokeless tobacco: Not on file   Substance and Sexual Activity    Alcohol use: Not on file    Drug use: Not on file    Sexual activity: Not on file   Other Topics Concern    Not on file   Social History Narrative    Not on file     Social Determinants of Health     Financial Resource Strain: Not on file   Food Insecurity: Not on file   Transportation Needs: Not on file   Physical Activity: Not on file   Stress: Not on file   Social Connections: Not on file   Intimate Partner Violence: Not on file   Housing Stability: Not on file       MEDICATIONS:  Current Outpatient Medications   Medication Sig Dispense Refill    acetaminophen (TYLENOL) 500 MG tablet Take 1,000 mg by mouth every 8 hours      amiodarone (PACERONE) 200 MG tablet Take 200 mg by mouth daily      atorvastatin (LIPITOR) 20 MG tablet Take 20 mg by mouth daily      docusate sodium (COLACE) 100 MG capsule Take 100 mg by mouth daily      finasteride (PROSCAR) 5 MG tablet Take 5 mg by mouth daily      furosemide (LASIX) 20 MG tablet Take 10 mg by mouth 2 times daily      hydrOXYzine (ATARAX) 10 MG tablet Take 10 mg by mouth every 6 hours as needed      isosorbide mononitrate (IMDUR) 30 MG 24 hr tablet Take 30 mg by mouth daily      Lidocaine (LIDOCARE) 4 % Patch Apply to intact skin to cover most painful area for max 12hr per 24hr period.      methimazole (TAPAZOLE) 5 MG tablet Take 1 tablet by mouth daily      metoprolol succinate ER (TOPROL XL) 50 MG 24 hr tablet Take 25 mg by mouth daily      multiple vitamin  tablet TABS Take 1 tablet by mouth 2 times daily      nitroGLYcerin  "(NITROSTAT) 0.4 MG sublingual tablet Place 0.4 mg under the tongue      polyethylene glycol (MIRALAX) 17 g packet 17 g by Nasogastric route      sertraline (ZOLOFT) 50 MG tablet Take 1 tablet by mouth daily      warfarin ANTICOAGULANT (COUMADIN) 2.5 MG tablet          ALLERGIES:  Allergies   Allergen Reactions    Beta Adrenergic Blockers Other (See Comments)     ALPHA 2 ADRENERGIC AGONIST        PHYSICAL EXAM:  General: Patient is alert male, no distress. On O2. [place].   Vitals: BP (!) 150/84   Pulse 61   Temp 98.9  F (37.2  C)   Resp 20   Ht 1.803 m (5' 11\")   Wt 83 kg (183 lb)   SpO2 91%   BMI 25.52 kg/m    HEENT: Head is NCAT. Eyes show no injection or icterus. Nares negative. Oropharynx well hydrated.  Neck: Aspen collar.   Lungs: Diminished at bases. No wheezes.  Cardiovascular: Regular rate and rhythm, normal S1. S2.  Back: No spinal or CVA tenderness.  Abdomen: Soft, no tenderness on exam. Bowel sounds present. No guarding rebound or rigidity.  : Deferred.  Extremities: Trace edema is noted.  Musculoskeletal: Age related degen changes.   Skin: No rases.   Psych: Mood appears pretty good.      LABS/DIAGNOSTIC DATA:  Ref Range & Units  8/24/2023    SODIUM 136 - 145 mmol/L 138   POTASSIUM 3.5 - 5.1 mmol/L 5.0   CHLORIDE 98 - 107 mmol/L 101   CO2,TOTAL 22 - 29 mmol/L 26   ANION GAP 5 - 18 11   GLUCOSE 70 - 99 mg/dL 102 High    CALCIUM 8.8 - 10.2 mg/dL 8.9   BUN 8 - 23 mg/dL 26 High    CREATININE 0.70 - 1.20 mg/dL 1.16   BUN/CREAT RATIO 10 - 20 22 High    eGFR >90 mL/min/1.73m2 61 Low      Ref Range & Units  8/24/2023    WHITE BLOOD COUNT         4.5 - 11.0 thou/cu mm 6.5   RED BLOOD COUNT           4.30 - 5.90 mil/cu mm 4.01 Low    HEMOGLOBIN               13.5 - 17.5 g/dL 13.2 Low    HEMATOCRIT               37.0 - 53.0 % 41.2   MCV                       80 - 100 fL 103 High    MCH                       26.0 - 34.0 pg 32.9   MCHC                     32.0 - 36.0 g/dL 32.0   RDW                       11.5 - " 15.5 % 15.8 High    PLATELET COUNT           140 - 440 thou/cu mm 203   MPV                       6.5 - 11.0 fL 9.9   NRBC                     % 0.0   ABS NRBC thou /cu mm 0.0       ASSESSMENT/PLAN:  CHF. Acute on chronic. Diuresed in the hospital, continuing on PO lasix. Also on isosorbide. Monitor weights, edema, clinical status. Follow up in CHF clinic in October.  Chronic resp failure. CHF plus pulm HTN, ASHLIE and restrictive lung disease. On home oxygen up to 6 LPM, continue.  Afib. Anticoagulated with coumadin. On amiodarone and metoprolol for rate control.  Odontoid fracture. Sustained in a fall approx 8/15/2023, seen by neurosurgery, non operative. Follow up in neurosurgeon office. Continue Aspen collar. Pain management adequate, has scheduled tylenol.   HTN. On metoprolol and lasix. Monitor Bps in TCU.   Hyperthyroidism. He is on methimazole.  Anxiety. Continue PTA Sertraline.   BPH. On finasteride.   Code status is DNR.         Electronically signed by: Millie Mejia MD

## 2023-09-12 VITALS
RESPIRATION RATE: 18 BRPM | TEMPERATURE: 98.5 F | HEART RATE: 63 BPM | OXYGEN SATURATION: 90 % | DIASTOLIC BLOOD PRESSURE: 77 MMHG | WEIGHT: 189.2 LBS | SYSTOLIC BLOOD PRESSURE: 140 MMHG | BODY MASS INDEX: 26.49 KG/M2 | HEIGHT: 71 IN

## 2023-09-13 ENCOUNTER — LAB REQUISITION (OUTPATIENT)
Dept: LAB | Facility: CLINIC | Age: 88
End: 2023-09-13
Payer: MEDICARE

## 2023-09-13 DIAGNOSIS — I48.0 PAROXYSMAL ATRIAL FIBRILLATION (H): ICD-10-CM

## 2023-09-14 ENCOUNTER — TRANSITIONAL CARE UNIT VISIT (OUTPATIENT)
Dept: GERIATRICS | Facility: CLINIC | Age: 88
End: 2023-09-14
Payer: MEDICARE

## 2023-09-14 VITALS
WEIGHT: 187 LBS | SYSTOLIC BLOOD PRESSURE: 122 MMHG | RESPIRATION RATE: 20 BRPM | TEMPERATURE: 98.2 F | BODY MASS INDEX: 26.18 KG/M2 | HEART RATE: 60 BPM | OXYGEN SATURATION: 90 % | HEIGHT: 71 IN | DIASTOLIC BLOOD PRESSURE: 63 MMHG

## 2023-09-14 DIAGNOSIS — S12.100D CLOSED ODONTOID FRACTURE WITH ROUTINE HEALING, SUBSEQUENT ENCOUNTER: ICD-10-CM

## 2023-09-14 DIAGNOSIS — I48.0 PAROXYSMAL ATRIAL FIBRILLATION (H): ICD-10-CM

## 2023-09-14 DIAGNOSIS — J96.21 ACUTE ON CHRONIC RESPIRATORY FAILURE WITH HYPOXIA (H): ICD-10-CM

## 2023-09-14 DIAGNOSIS — I50.32 CHRONIC DIASTOLIC CHF (CONGESTIVE HEART FAILURE) (H): Primary | ICD-10-CM

## 2023-09-14 PROBLEM — N13.8 BPH WITH URINARY OBSTRUCTION: Status: ACTIVE | Noted: 2018-02-26

## 2023-09-14 PROBLEM — U07.1 PNEUMONIA DUE TO COVID-19 VIRUS: Status: ACTIVE | Noted: 2022-11-21

## 2023-09-14 PROBLEM — J12.82 PNEUMONIA DUE TO COVID-19 VIRUS: Status: ACTIVE | Noted: 2022-11-21
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
## 2023-09-14 PROBLEM — S32.10XA SACRAL FRACTURE (H): Status: ACTIVE | Noted: 2022-05-27

## 2023-09-14 PROBLEM — I25.10 CORONARY ARTERY DISEASE INVOLVING NATIVE CORONARY ARTERY OF NATIVE HEART: Status: ACTIVE | Noted: 2019-01-21

## 2023-09-14 PROBLEM — I50.33 ACUTE ON CHRONIC HEART FAILURE WITH PRESERVED EJECTION FRACTION (HFPEF) (H): Status: ACTIVE | Noted: 2023-08-24

## 2023-09-14 PROBLEM — R29.6 FALLS FREQUENTLY: Status: ACTIVE | Noted: 2022-06-20

## 2023-09-14 PROBLEM — Z79.01 ANTICOAGULATED ON COUMADIN: Status: ACTIVE | Noted: 2023-08-17

## 2023-09-14 PROBLEM — J96.20 ACUTE ON CHRONIC RESPIRATORY FAILURE (H): Status: ACTIVE | Noted: 2023-08-24

## 2023-09-14 PROBLEM — S12.110A ODONTOID FRACTURE (H): Status: ACTIVE | Noted: 2023-08-17

## 2023-09-14 PROBLEM — F41.9 ANXIETY: Status: ACTIVE | Noted: 2018-02-27

## 2023-09-14 PROBLEM — Z79.01 ANTICOAGULATION MONITORING, INR RANGE 2-3: Status: ACTIVE | Noted: 2022-12-02

## 2023-09-14 PROBLEM — N40.1 BPH WITH URINARY OBSTRUCTION: Status: ACTIVE | Noted: 2018-02-26

## 2023-09-14 PROBLEM — S42.002A CLOSED LEFT CLAVICULAR FRACTURE: Status: ACTIVE | Noted: 2022-05-27

## 2023-09-14 LAB — INR PPP: 1.42 (ref 0.85–1.15)

## 2023-09-14 PROCEDURE — 85610 PROTHROMBIN TIME: CPT | Performed by: NURSE PRACTITIONER

## 2023-09-14 PROCEDURE — 99309 SBSQ NF CARE MODERATE MDM 30: CPT | Performed by: FAMILY MEDICINE

## 2023-09-14 PROCEDURE — P9603 ONE-WAY ALLOW PRORATED MILES: HCPCS | Performed by: NURSE PRACTITIONER

## 2023-09-14 PROCEDURE — 36415 COLL VENOUS BLD VENIPUNCTURE: CPT | Performed by: NURSE PRACTITIONER

## 2023-09-14 NOTE — LETTER
9/14/2023        RE: Zachery Jimenez  [id number] [address] [id number]          [facility] GERIATRICS  [place] Medical Record Number:  [id number]  Place of Service where encounter took place: [facility] ([facility]) [[id number]]  CODE STATUS:   DNR only    Chief Complaint/Reason for Visit:  Chief Complaint   Patient presents with     Follow Up     TCU 9/14/2023. CHF exacerbation. Chronic home O2. Recent fall with odontoid fracture, non operative. Afib on coumadin.        TCU HPI:    Zachery Jimenez is a 88 year old male with hx of CHF, chronic resp failure on home O2, afib on coumadin, anxiety, admitted to the hospital on 8/24/2023 with fluid overload and CHF exac following prior hospitalization after a fall with odontoid Fx, non operative.     HOSPITALIST DISCHARGE SUMMARY   [facility]   Admission Date: 8/24/2023  Discharge Date: 8/30/2023    Hospital Course   Mr. Zachery Jimenez is a 88 y.o. male with a history of paroxysmal atrial fibrillation chronically anticoagulated with Coumadin, pulm HTN, on 5L home O2 at baseline, obstructive sleep apnea, tachybradycardia syndrome status post pacemaker placement admitted with acute on chronic diastolic CHF exacerbation.    Fluid overload  Hypoxia  Acute on chronic diastolic CHF  Chronic pulmonary hypertension   Restrictive lung disease due to kyphosis  Presented with hypoxia/shortness of breath.   In ED found to have proBNP 6982,   Chest x-ray with pulmonary vascular congestion/interstitial pulmonary opacities.   Received 80 mg intravenous Lasix with a 1 L urine output.  Patient is currently living at home, receiving home health care, family staying 24/7, past 2 to 3 days noted to be a little more short of breath, possibly some lightheadedness.     He was initially started on IV lasix 20 mg IV every 8 hours. Low blood pressure levels with the diuresis and we held the lasix IV and oral.  Good UOP but low blood pressure levels  8/25/2023.  Pro BNP 8/26/2023 showed improvement.  Discharged on Lasix 10 mg twice daily.  Cr stable in 1.1-1.2 range.  Dry wt around 81 kg.    Acute on chronic hypoxia  Due to the CHF plus pulmonary hypertension and chronic lung disease  Note: Patient was admitted to [facility] from 8/17-8/19 after a fall, found to have type II odontoid fracture along with a chronic appearing 20% C7 compression fracture. He was diuresed for acute pulmonary edema/fluid overload, was sent home with a Lasix 20 mg daily. He had episodes of delirium likely related to narcotics. Became dysphoric, disoriented, agitated with Dilaudid. Daughter had concerns about use of narcotics for pain control/sedatives/confusion in the hospital.  Weaned karla to usual home O2 regimen (up to 6L).    Atrial fibrillation  We continued the coumadin     Overall stabilized and discharged to TCU on 8/30/2023 for PT, OT, nursing cares, medical management and monitoring.     Today:  No neck pain, continues with Aspen collar, unclear when neurosurgery follow up is. Feels stronger working with therapy. Breathing at baseline, on oxygen. No chest pain. LE edema, on small dose lasix. No dyspnea at rest or dizziness. On isosorbide, metoprolol, amiodarone. Anticoagulated with coumadin for Afib, adequate rate control. Has follow up appt in CHF clinic in October. Appetite is good. No diarrhea or constipation. No nausea or vomiting. Hx BPH on finasteride.      PAST MEDICAL HISTORY:  Past Medical History:   Diagnosis Date     Afib (H)      Anxiety      Congestive heart failure (H)      Hyperlipidemia      Hypertension      Peripheral autonomic neuropathy in disorders classified elsewhere        MEDICATIONS:  Current Outpatient Medications   Medication Sig Dispense Refill     acetaminophen (TYLENOL) 500 MG tablet Take 1,000 mg by mouth every 8 hours       amiodarone (PACERONE) 200 MG tablet Take 200 mg by mouth daily       atorvastatin (LIPITOR) 20 MG tablet Take 20 mg by  "mouth daily       docusate sodium (COLACE) 100 MG capsule Take 100 mg by mouth daily       finasteride (PROSCAR) 5 MG tablet Take 5 mg by mouth daily       furosemide (LASIX) 20 MG tablet Take 10 mg by mouth 2 times daily       isosorbide mononitrate (IMDUR) 30 MG 24 hr tablet Take 30 mg by mouth daily       Lidocaine (LIDOCARE) 4 % Patch Apply to intact skin to cover most painful area for max 12hr per 24hr period.       methimazole (TAPAZOLE) 5 MG tablet Take 1 tablet by mouth daily       metoprolol succinate ER (TOPROL XL) 50 MG 24 hr tablet Take 25 mg by mouth daily       multiple vitamin  tablet TABS Take 1 tablet by mouth 2 times daily       nitroGLYcerin (NITROSTAT) 0.4 MG sublingual tablet Place 0.4 mg under the tongue       polyethylene glycol (MIRALAX) 17 g packet 17 g by Nasogastric route       sertraline (ZOLOFT) 50 MG tablet Take 1 tablet by mouth daily       warfarin ANTICOAGULANT (COUMADIN) 2.5 MG tablet          PHYSICAL EXAM:  General: Patient is alert male, no distress. On O2. [place].   Vitals: /63   Pulse 60   Temp 98.2  F (36.8  C)   Resp 20   Ht 1.803 m (5' 11\")   Wt 84.8 kg (187 lb)   SpO2 90%   BMI 26.08 kg/m    HEENT: Head is NCAT. Eyes show no injection or icterus. Nares negative. Oropharynx well hydrated.  Neck: Aspen collar.   Lungs: Nonlabored respirations.   : Deferred.  Extremities: Minimal edema.  Musculoskeletal: Age related degen changes.   Skin: Warm and dry.   Psych: Mood is good.       LABS/DIAGNOSTIC DATA:  Ref Range & Units  8/24/2023    SODIUM 136 - 145 mmol/L 138   POTASSIUM 3.5 - 5.1 mmol/L 5.0   CHLORIDE 98 - 107 mmol/L 101   CO2,TOTAL 22 - 29 mmol/L 26   ANION GAP 5 - 18 11   GLUCOSE 70 - 99 mg/dL 102 High    CALCIUM 8.8 - 10.2 mg/dL 8.9   BUN 8 - 23 mg/dL 26 High    CREATININE 0.70 - 1.20 mg/dL 1.16   BUN/CREAT RATIO 10 - 20 22 High    eGFR >90 mL/min/1.73m2 61 Low      Ref Range & Units  8/24/2023    WHITE BLOOD COUNT         4.5 - 11.0 thou/cu mm 6.5   RED " BLOOD COUNT           4.30 - 5.90 mil/cu mm 4.01 Low    HEMOGLOBIN               13.5 - 17.5 g/dL 13.2 Low    HEMATOCRIT               37.0 - 53.0 % 41.2   MCV                       80 - 100 fL 103 High    MCH                       26.0 - 34.0 pg 32.9   MCHC                     32.0 - 36.0 g/dL 32.0   RDW                       11.5 - 15.5 % 15.8 High    PLATELET COUNT           140 - 440 thou/cu mm 203   MPV                       6.5 - 11.0 fL 9.9   NRBC                     % 0.0   ABS NRBC thou /cu mm 0.0       ASSESSMENT/PLAN:  CHF. Acute on chronic. Diuresed in the hospital, continuing on PO lasix. Also on isosorbide. Monitor weights, edema, clinical status. Follow up in CHF clinic in October. Stable.   Chronic resp failure. CHF plus pulm HTN, ASHLIE and restrictive lung disease. On home oxygen up to 6 LPM, continue. No acute respiratory issues.   Afib. Anticoagulated with coumadin. Monitor and adjust per INR. On amiodarone and metoprolol for rate control.  Odontoid fracture. Sustained in a fall approx 8/15/2023, seen by neurosurgery, non operative. Follow up in neurosurgeon office. Continue Aspen collar.   HTN. On metoprolol and lasix. Monitor Bps in TCU.        Electronically signed by: Millie Mejia MD           Sincerely,        Millie Mejia MD

## 2023-09-16 ENCOUNTER — LAB REQUISITION (OUTPATIENT)
Dept: LAB | Facility: CLINIC | Age: 88
End: 2023-09-16
Payer: MEDICARE

## 2023-09-16 DIAGNOSIS — I48.0 PAROXYSMAL ATRIAL FIBRILLATION (H): ICD-10-CM

## 2023-09-18 LAB — INR PPP: 1.6 (ref 0.85–1.15)

## 2023-09-18 PROCEDURE — 36415 COLL VENOUS BLD VENIPUNCTURE: CPT | Performed by: FAMILY MEDICINE

## 2023-09-18 PROCEDURE — 85610 PROTHROMBIN TIME: CPT | Performed by: FAMILY MEDICINE

## 2023-09-18 PROCEDURE — P9604 ONE-WAY ALLOW PRORATED TRIP: HCPCS | Performed by: FAMILY MEDICINE

## 2023-09-18 NOTE — PROGRESS NOTES
[facility] GERIATRICS  [place] Medical Record Number:  [id number]  Place of Service where encounter took place: [facility] ([facility]) [[id number]]  CODE STATUS:   DNR only    Chief Complaint/Reason for Visit:  Chief Complaint   Patient presents with    RECHECK     TCU 9/7/2023. CHF exacerbation. Chronic home O2. Recent fall with odontoid fracture, non operative. Afib on coumadin.        TCU HPI:    Zachery Jimenez is a 88 year old male with hx of CHF, chronic resp failure on home O2, afib on coumadin, anxiety, admitted to the hospital on 8/24/2023 with fluid overload and CHF exac following prior hospitalization after a fall with odontoid Fx, non operative.     HOSPITALIST DISCHARGE SUMMARY  [facility]   Admission Date: 8/24/2023  Discharge Date: 8/30/2023    Hospital Course   Mr. Zachery Jimenez is a 88 y.o. male with a history of paroxysmal atrial fibrillation chronically anticoagulated with Coumadin, pulm HTN, on 5L home O2 at baseline, obstructive sleep apnea, tachybradycardia syndrome status post pacemaker placement admitted with acute on chronic diastolic CHF exacerbation.    Fluid overload  Hypoxia  Acute on chronic diastolic CHF  Chronic pulmonary hypertension   Restrictive lung disease due to kyphosis  Presented with hypoxia/shortness of breath.   In ED found to have proBNP 6982,   Chest x-ray with pulmonary vascular congestion/interstitial pulmonary opacities.   Received 80 mg intravenous Lasix with a 1 L urine output.  Patient is currently living at home, receiving home health care, family staying 24/7, past 2 to 3 days noted to be a little more short of breath, possibly some lightheadedness.     He was initially started on IV lasix 20 mg IV every 8 hours. Low blood pressure levels with the diuresis and we held the lasix IV and oral.  Good UOP but low blood pressure levels 8/25/2023.  Pro BNP 8/26/2023 showed improvement.  Discharged on Lasix 10 mg twice daily.  Cr  stable in 1.1-1.2 range.  Dry wt around 81 kg.    Acute on chronic hypoxia  Due to the CHF plus pulmonary hypertension and chronic lung disease  Note: Patient was admitted to [facility] from 8/17-8/19 after a fall, found to have type II odontoid fracture along with a chronic appearing 20% C7 compression fracture. He was diuresed for acute pulmonary edema/fluid overload, was sent home with a Lasix 20 mg daily. He had episodes of delirium likely related to narcotics. Became dysphoric, disoriented, agitated with Dilaudid. Daughter had concerns about use of narcotics for pain control/sedatives/confusion in the hospital.  Weaned karla to usual home O2 regimen (up to 6L).    Atrial fibrillation  We continued the coumadin     Overall stabilized and discharged to TCU on 8/30/2023 for PT, OT, nursing cares, medical management and monitoring.     Today:  Wearing hard Aspen C collar, needs follow up with neurosurgery. States no pain in neck currently. Reports breathing is at baseline, chronic O2 per NC. No chest pain or dizziness. On lasix 10 mg BID. Has other cardiac meds, isosorbide, metoprolol, amiodarone. Anticoagulated with coumadin for Afib, adequate rate control. Has follow up appt in CHF clinic in October. Appetite is good. No diarrhea or constipation. No nausea or vomiting. Hx BPH on finasteride.      PAST MEDICAL HISTORY:  Past Medical History:   Diagnosis Date    Afib (H)     Anxiety     Congestive heart failure (H)     Hyperlipidemia     Hypertension     Peripheral autonomic neuropathy in disorders classified elsewhere        MEDICATIONS:  Current Outpatient Medications   Medication Sig Dispense Refill    acetaminophen (TYLENOL) 500 MG tablet Take 1,000 mg by mouth every 8 hours      amiodarone (PACERONE) 200 MG tablet Take 200 mg by mouth daily      atorvastatin (LIPITOR) 20 MG tablet Take 20 mg by mouth daily      docusate sodium (COLACE) 100 MG capsule Take 100 mg by mouth daily      finasteride (PROSCAR) 5 MG  "tablet Take 5 mg by mouth daily      furosemide (LASIX) 20 MG tablet Take 10 mg by mouth 2 times daily      hydrOXYzine (ATARAX) 10 MG tablet Take 10 mg by mouth every 6 hours as needed      isosorbide mononitrate (IMDUR) 30 MG 24 hr tablet Take 30 mg by mouth daily      Lidocaine (LIDOCARE) 4 % Patch Apply to intact skin to cover most painful area for max 12hr per 24hr period.      methimazole (TAPAZOLE) 5 MG tablet Take 1 tablet by mouth daily      metoprolol succinate ER (TOPROL XL) 50 MG 24 hr tablet Take 25 mg by mouth daily      multiple vitamin  tablet TABS Take 1 tablet by mouth 2 times daily      nitroGLYcerin (NITROSTAT) 0.4 MG sublingual tablet Place 0.4 mg under the tongue      polyethylene glycol (MIRALAX) 17 g packet 17 g by Nasogastric route      sertraline (ZOLOFT) 50 MG tablet Take 1 tablet by mouth daily      warfarin ANTICOAGULANT (COUMADIN) 2.5 MG tablet          PHYSICAL EXAM:  General: Patient is alert male, no distress. On O2. [place].   Vitals: /47   Pulse 71   Temp 98.2  F (36.8  C)   Resp 18   Ht 1.803 m (5' 11\")   Wt 83.7 kg (184 lb 8 oz)   SpO2 92%   BMI 25.73 kg/m    HEENT: Head is NCAT. Eyes show no injection or icterus. Nares negative. Oropharynx well hydrated.  Neck: Aspen collar.   Lungs: Nonlabored respirations.   : Deferred.  Extremities: Minimal edema.  Musculoskeletal: Age related degen changes.   Skin: Warm and dry.   Psych: Mood is good.         LABS/DIAGNOSTIC DATA:  Ref Range & Units  8/24/2023    SODIUM 136 - 145 mmol/L 138   POTASSIUM 3.5 - 5.1 mmol/L 5.0   CHLORIDE 98 - 107 mmol/L 101   CO2,TOTAL 22 - 29 mmol/L 26   ANION GAP 5 - 18 11   GLUCOSE 70 - 99 mg/dL 102 High    CALCIUM 8.8 - 10.2 mg/dL 8.9   BUN 8 - 23 mg/dL 26 High    CREATININE 0.70 - 1.20 mg/dL 1.16   BUN/CREAT RATIO 10 - 20 22 High    eGFR >90 mL/min/1.73m2 61 Low      Ref Range & Units  8/24/2023    WHITE BLOOD COUNT         4.5 - 11.0 thou/cu mm 6.5   RED BLOOD COUNT           4.30 - 5.90 mil/cu " mm 4.01 Low    HEMOGLOBIN               13.5 - 17.5 g/dL 13.2 Low    HEMATOCRIT               37.0 - 53.0 % 41.2   MCV                       80 - 100 fL 103 High    MCH                       26.0 - 34.0 pg 32.9   MCHC                     32.0 - 36.0 g/dL 32.0   RDW                       11.5 - 15.5 % 15.8 High    PLATELET COUNT           140 - 440 thou/cu mm 203   MPV                       6.5 - 11.0 fL 9.9   NRBC                     % 0.0   ABS NRBC thou /cu mm 0.0       ASSESSMENT/PLAN:  CHF. Acute on chronic. Diuresed in the hospital, continuing on PO lasix. Also on isosorbide. Monitor weights, edema, clinical status. Follow up in CHF clinic in October.  Chronic resp failure. CHF plus pulm HTN, ASHLIE and restrictive lung disease. On home oxygen up to 6 LPM, continue. Resp status at baseline.   Afib. Anticoagulated with coumadin. On amiodarone and metoprolol for rate control.  Odontoid fracture. Sustained in a fall approx 8/15/2023, seen by neurosurgery, non operative. Follow up in neurosurgeon office. Continue Aspen collar.   HTN. On metoprolol and lasix. Monitor Bps in TCU.        Electronically signed by: Millie Mejia MD

## 2023-09-24 ENCOUNTER — LAB REQUISITION (OUTPATIENT)
Dept: LAB | Facility: CLINIC | Age: 88
End: 2023-09-24
Payer: MEDICARE

## 2023-09-24 DIAGNOSIS — I48.0 PAROXYSMAL ATRIAL FIBRILLATION (H): ICD-10-CM

## 2023-09-25 ENCOUNTER — DISCHARGE SUMMARY NURSING HOME (OUTPATIENT)
Dept: GERIATRICS | Facility: CLINIC | Age: 88
End: 2023-09-25
Payer: MEDICARE

## 2023-09-25 VITALS
BODY MASS INDEX: 26.5 KG/M2 | RESPIRATION RATE: 18 BRPM | WEIGHT: 189.3 LBS | DIASTOLIC BLOOD PRESSURE: 76 MMHG | HEIGHT: 71 IN | OXYGEN SATURATION: 92 % | TEMPERATURE: 98.5 F | SYSTOLIC BLOOD PRESSURE: 151 MMHG | HEART RATE: 63 BPM

## 2023-09-25 DIAGNOSIS — F41.9 ANXIETY: ICD-10-CM

## 2023-09-25 DIAGNOSIS — I50.32 CHRONIC DIASTOLIC CHF (CONGESTIVE HEART FAILURE) (H): ICD-10-CM
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
## 2023-09-25 DIAGNOSIS — J96.11 CHRONIC RESPIRATORY FAILURE WITH HYPOXIA (H): ICD-10-CM

## 2023-09-25 DIAGNOSIS — I10 HYPERTENSION, UNSPECIFIED TYPE: ICD-10-CM

## 2023-09-25 DIAGNOSIS — S12.100S CLOSED ODONTOID FRACTURE, SEQUELA: Primary | ICD-10-CM

## 2023-09-25 DIAGNOSIS — I48.0 PAROXYSMAL ATRIAL FIBRILLATION (H): ICD-10-CM

## 2023-09-25 LAB — INR PPP: 1.67 (ref 0.85–1.15)

## 2023-09-25 PROCEDURE — 36415 COLL VENOUS BLD VENIPUNCTURE: CPT | Performed by: NURSE PRACTITIONER

## 2023-09-25 PROCEDURE — 99316 NF DSCHRG MGMT 30 MIN+: CPT | Performed by: NURSE PRACTITIONER

## 2023-09-25 PROCEDURE — 85610 PROTHROMBIN TIME: CPT | Performed by: NURSE PRACTITIONER

## 2023-09-25 PROCEDURE — P9603 ONE-WAY ALLOW PRORATED MILES: HCPCS | Performed by: NURSE PRACTITIONER

## 2023-09-25 NOTE — PROGRESS NOTES
[facility] GERIATRICS DISCHARGE SUMMARY  PATIENT'S NAME: Zachery Jimenez  YOB: 1934  MEDICAL RECORD NUMBER:  [id number]  Place of Service where encounter took place:  [facility] ([facility]) [[id number]]    PRIMARY CARE PROVIDER AND CLINIC RESPONSIBLE AFTER TRANSFER:   Provider Outside, No address on file     Transferring providers: Cecy Riddle CNP, Millie Mejia MD,   Recent Hospitalization/ED:  [facility]  stay 8/24/23 to 8/30/23.  Date of SNF Admission: August 30, 2023  Date of SNF (anticipated) Discharge: September 27, 2023  Discharged to: previous independent home  Cognitive Scores:  n/a  Physical Function: Ambulating  DME: Wheelchair and Home Oxygen    Due to diagnosis of CHF, pulmonary htn my patient will require and benefit from a manual 20 inch x 18inch wheelchair with a pair of standard foot rests, a pair of full-length armrests, and an 20x18 inch comfort curve cushion for lifetime use.      Patient has mobility limitation that significantly impairs her ability to participate in mobility related and ADLs such as food prep, dressing, transfers and ambulation.  This limitation cannot be sufficiently and safely resolved by the use of a cane, walker or crutches due to weakness related to CHF and pulmonary htn.  Patient and caregiver are able to safely use a wheelchair and mobility deficit can be resolved with its use.  Patient's home provides adequate access and maneuvering space.  Patient will use a wheelchair daily and has not expressed an unwillingness to use it within her home.    Discharge date: 9/27/23     CODE STATUS/ADVANCE DIRECTIVES DISCUSSION:  No Order DNR  ALLERGIES: Beta adrenergic blockers    NURSING FACILITY COURSE   Medication Changes/Rationale:   None    Summary of nursing facility stay:    Overall, pt feels ready to return home with family. He has a strong support network with his children helping him. He continues on 5L  home O2 and reports improvement with walking with PT. Denies SOB, CP. He shows no signs of fluid overload, has a good appetite and normal bowel regimen. Mild pain around neck managed with Tylenol, continues to wear Aspen collar.     (S12.100S) Closed odontoid fracture, sequela  (primary encounter diagnosis)  (W19.XXXS) Fall, sequela  Comment: Fracture sustained from fall on 8/15/23, non operative per neurosurgery.   Plan: Continue [place] collar. Scheduled tylenol for pain management. F/u w/ neurosurgery.     (I50.32) Chronic diastolic CHF (congestive heart failure) (H)  Comment: Weight up 10 lbs since hospital discharge. Possible over-diuresis inpatient, patient appears to be back to dry weight. Currently 189 lbs, weights btw 184-189 in TCU. No evidence of fluid overload, no increased SOB or HORN, no LE edema. Pt feels well when walking with therapy and states his activity tolerance has improved.    Plan: Continue isosorbide and PO lasix 10 mg BID. Monitor weights at home. CHF clinic f/u in October.     (J96.11) Chronic respiratory failure with hypoxia (H)  Comment: CHF plus pulm HTN, ASHLIE and restrictive lung disease. Denies increased SOB.   Plan: Continue home O2, on 5 LPM.     (I48.0) Paroxysmal atrial fibrillation (H)  Comment: Pt reports his daughter will be able to take him to clinic for outpatient INR checks.   Plan: Continue coumadin and routine INR checks. Continue amiodarone and metoprolol for rate control.     (I10) Hypertension, unspecified type  Comment: SBPs <160.   Plan: Continue metoprolol & lasix.     (F41.9) Anxiety  Comment: Pt reports anxiety has been well controlled.   Plan: Continue sertraline     Discharge Medications:  MED REC REQUIRED  Post Medication Reconciliation Status:  Discharge medications reconciled, continue medications without change    Current Outpatient Medications   Medication Sig Dispense Refill    acetaminophen (TYLENOL) 500 MG tablet Take 1,000 mg by mouth every 8 hours       "amiodarone (PACERONE) 200 MG tablet Take 200 mg by mouth daily      atorvastatin (LIPITOR) 20 MG tablet Take 20 mg by mouth daily      docusate sodium (COLACE) 100 MG capsule Take 100 mg by mouth daily      finasteride (PROSCAR) 5 MG tablet Take 5 mg by mouth daily      furosemide (LASIX) 20 MG tablet Take 10 mg by mouth 2 times daily      isosorbide mononitrate (IMDUR) 30 MG 24 hr tablet Take 30 mg by mouth daily      Lidocaine (LIDOCARE) 4 % Patch Apply to intact skin to cover most painful area for max 12hr per 24hr period.      methimazole (TAPAZOLE) 5 MG tablet Take 1 tablet by mouth daily      metoprolol succinate ER (TOPROL XL) 50 MG 24 hr tablet Take 25 mg by mouth daily      multiple vitamin  tablet TABS Take 1 tablet by mouth 2 times daily      nitroGLYcerin (NITROSTAT) 0.4 MG sublingual tablet Place 0.4 mg under the tongue      polyethylene glycol (MIRALAX) 17 g packet 17 g by Nasogastric route      sertraline (ZOLOFT) 50 MG tablet Take 1 tablet by mouth daily      warfarin ANTICOAGULANT (COUMADIN) 2.5 MG tablet         Controlled medications:   not applicable/none     Past Medical History:   Past Medical History:   Diagnosis Date    Afib (H)     Anxiety     Congestive heart failure (H)     Hyperlipidemia     Hypertension     Peripheral autonomic neuropathy in disorders classified elsewhere      Physical Exam:   Vitals: BP (!) 151/76   Pulse 63   Temp 98.5  F (36.9  C)   Resp 18   Ht 1.803 m (5' 11\")   Wt 85.9 kg (189 lb 4.8 oz)   SpO2 92%   BMI 26.40 kg/m    BMI: Body mass index is 26.4 kg/m .      General appearance: Alert, calm, cooperative.   Resp: Lungs clear to auscultation, unlabored, no wheezing.  CV: Regular rate and rhythm   Abdomen: Normal bowel sounds, soft, non tender.   Extremities: No cyanosis or edema.   Skin: Eraser sized pink discoloration between 4th and 5th right toes, intact.  Neuro: Oriented. No focal deficits.   Psych: Pleasant, calm affect.      SNF labs: Recent labs in [facility] " reviewed by me today.     DISCHARGE PLAN:  Follow up labs: INR due by 5-7 days, and patient instructed to go to outpatient clinic for lab draw   Medical Follow Up:      Follow up with primary care provider in 1-2 weeks  [facility] scheduled appointments: n/a. Has CHF clinic appointment in early October.   Discharge Services: Home Care:  Occupational Therapy, Physical Therapy, Registered Nurse, and Home Health Aide  Discharge Instructions Verbalized to Patient at Discharge:   Weigh yourself daily in the morning and keep a record. Call your primary clinic: a) if you are more short of breath, or b) if your weight changes more than 3 pounds in one day or more than 5 pounds in one week.   Oxygen at 5-6 liters/minute via nasal cannula.     TOTAL DISCHARGE TIME:   Greater than 30 minutes    Joyce Sosa, MSN, RN, DNP Student  [facility]     I was present with the student who particpated in the serivce and documentation of the note. I have verified the history and personally peformed the physical exam and medical decision making. I agree with the assessment and plan of care as documented in the note.     Electronically signed by:  Cecy Riddle CNP     Documentation of Face to Face and Certification for Home Health Services    I certify that patient: Zachery is under my care and that I, or a nurse practitioner or physician's assistant working with me, had a face-to-face encounter that meets the physician face-to-face encounter requirements with this patient on: 9/25/23.    This encounter with the patient was in whole, or in part, for the following medical condition, which is the primary reason for home health care: CHF, pulmonary htn    I certify that, based on my findings, the following services are medically necessary home health services: Nursing, Occupational Therapy, and Physical Therapy.    My clinical findings support the need for the above services because: RN required for medication  management, PT and OT required for continued functional improvment in home setting .     Further, I certify that my clinical findings support that this patient is homebound (i.e. absences from home require considerable and taxing effort and are for medical reasons or Christian services or infrequently or of short duration when for other reasons) because: Requires assistance of another person or specialized equipment to access medical services because patient: Is unable to walk greater than 200 feet without rest. and Requires supervision of another for safe transfer...    Based on the above findings. I certify that this patient is confined to the home and needs intermittent skilled nursing care, physical therapy and/or speech therapy.  The patient is under my care, and I have initiated the establishment of the plan of care.  This patient will be followed by a physician who will periodically review the plan of care.

## 2023-09-25 NOTE — PROGRESS NOTES
[facility] GERIATRICS  [place] Medical Record Number:  [id number]  Place of Service where encounter took place: [facility] ([facility]) [[id number]]  CODE STATUS:   DNR only    Chief Complaint/Reason for Visit:  Chief Complaint   Patient presents with    Follow Up     TCU 9/14/2023. CHF exacerbation. Chronic home O2. Recent fall with odontoid fracture, non operative. Afib on coumadin.        TCU HPI:    Zachery Jimenez is a 88 year old male with hx of CHF, chronic resp failure on home O2, afib on coumadin, anxiety, admitted to the hospital on 8/24/2023 with fluid overload and CHF exac following prior hospitalization after a fall with odontoid Fx, non operative.     HOSPITALIST DISCHARGE SUMMARY  [facility]   Admission Date: 8/24/2023  Discharge Date: 8/30/2023    Hospital Course   Mr. Zachery Jimenez is a 88 y.o. male with a history of paroxysmal atrial fibrillation chronically anticoagulated with Coumadin, pulm HTN, on 5L home O2 at baseline, obstructive sleep apnea, tachybradycardia syndrome status post pacemaker placement admitted with acute on chronic diastolic CHF exacerbation.    Fluid overload  Hypoxia  Acute on chronic diastolic CHF  Chronic pulmonary hypertension   Restrictive lung disease due to kyphosis  Presented with hypoxia/shortness of breath.   In ED found to have proBNP 6982,   Chest x-ray with pulmonary vascular congestion/interstitial pulmonary opacities.   Received 80 mg intravenous Lasix with a 1 L urine output.  Patient is currently living at home, receiving home health care, family staying 24/7, past 2 to 3 days noted to be a little more short of breath, possibly some lightheadedness.     He was initially started on IV lasix 20 mg IV every 8 hours. Low blood pressure levels with the diuresis and we held the lasix IV and oral.  Good UOP but low blood pressure levels 8/25/2023.  Pro BNP 8/26/2023 showed improvement.  Discharged on Lasix 10 mg twice daily.  Cr  stable in 1.1-1.2 range.  Dry wt around 81 kg.    Acute on chronic hypoxia  Due to the CHF plus pulmonary hypertension and chronic lung disease  Note: Patient was admitted to [facility] from 8/17-8/19 after a fall, found to have type II odontoid fracture along with a chronic appearing 20% C7 compression fracture. He was diuresed for acute pulmonary edema/fluid overload, was sent home with a Lasix 20 mg daily. He had episodes of delirium likely related to narcotics. Became dysphoric, disoriented, agitated with Dilaudid. Daughter had concerns about use of narcotics for pain control/sedatives/confusion in the hospital.  Weaned karla to usual home O2 regimen (up to 6L).    Atrial fibrillation  We continued the coumadin     Overall stabilized and discharged to TCU on 8/30/2023 for PT, OT, nursing cares, medical management and monitoring.     Today:  No neck pain, continues with Aspen collar, unclear when neurosurgery follow up is. Feels stronger working with therapy. Breathing at baseline, on oxygen. No chest pain. LE edema, on small dose lasix. No dyspnea at rest or dizziness. On isosorbide, metoprolol, amiodarone. Anticoagulated with coumadin for Afib, adequate rate control. Has follow up appt in CHF clinic in October. Appetite is good. No diarrhea or constipation. No nausea or vomiting. Hx BPH on finasteride.      PAST MEDICAL HISTORY:  Past Medical History:   Diagnosis Date    Afib (H)     Anxiety     Congestive heart failure (H)     Hyperlipidemia     Hypertension     Peripheral autonomic neuropathy in disorders classified elsewhere        MEDICATIONS:  Current Outpatient Medications   Medication Sig Dispense Refill    acetaminophen (TYLENOL) 500 MG tablet Take 1,000 mg by mouth every 8 hours      amiodarone (PACERONE) 200 MG tablet Take 200 mg by mouth daily      atorvastatin (LIPITOR) 20 MG tablet Take 20 mg by mouth daily      docusate sodium (COLACE) 100 MG capsule Take 100 mg by mouth daily      finasteride  "(PROSCAR) 5 MG tablet Take 5 mg by mouth daily      furosemide (LASIX) 20 MG tablet Take 10 mg by mouth 2 times daily      isosorbide mononitrate (IMDUR) 30 MG 24 hr tablet Take 30 mg by mouth daily      Lidocaine (LIDOCARE) 4 % Patch Apply to intact skin to cover most painful area for max 12hr per 24hr period.      methimazole (TAPAZOLE) 5 MG tablet Take 1 tablet by mouth daily      metoprolol succinate ER (TOPROL XL) 50 MG 24 hr tablet Take 25 mg by mouth daily      multiple vitamin  tablet TABS Take 1 tablet by mouth 2 times daily      nitroGLYcerin (NITROSTAT) 0.4 MG sublingual tablet Place 0.4 mg under the tongue      polyethylene glycol (MIRALAX) 17 g packet 17 g by Nasogastric route      sertraline (ZOLOFT) 50 MG tablet Take 1 tablet by mouth daily      warfarin ANTICOAGULANT (COUMADIN) 2.5 MG tablet          PHYSICAL EXAM:  General: Patient is alert male, no distress. On O2. [place].   Vitals: /63   Pulse 60   Temp 98.2  F (36.8  C)   Resp 20   Ht 1.803 m (5' 11\")   Wt 84.8 kg (187 lb)   SpO2 90%   BMI 26.08 kg/m    HEENT: Head is NCAT. Eyes show no injection or icterus. Nares negative. Oropharynx well hydrated.  Neck: Aspen collar.   Lungs: Nonlabored respirations.   : Deferred.  Extremities: Minimal edema.  Musculoskeletal: Age related degen changes.   Skin: Warm and dry.   Psych: Mood is good.       LABS/DIAGNOSTIC DATA:  Ref Range & Units  8/24/2023    SODIUM 136 - 145 mmol/L 138   POTASSIUM 3.5 - 5.1 mmol/L 5.0   CHLORIDE 98 - 107 mmol/L 101   CO2,TOTAL 22 - 29 mmol/L 26   ANION GAP 5 - 18 11   GLUCOSE 70 - 99 mg/dL 102 High    CALCIUM 8.8 - 10.2 mg/dL 8.9   BUN 8 - 23 mg/dL 26 High    CREATININE 0.70 - 1.20 mg/dL 1.16   BUN/CREAT RATIO 10 - 20 22 High    eGFR >90 mL/min/1.73m2 61 Low      Ref Range & Units  8/24/2023    WHITE BLOOD COUNT         4.5 - 11.0 thou/cu mm 6.5   RED BLOOD COUNT           4.30 - 5.90 mil/cu mm 4.01 Low    HEMOGLOBIN               13.5 - 17.5 g/dL 13.2 Low  "   HEMATOCRIT               37.0 - 53.0 % 41.2   MCV                       80 - 100 fL 103 High    MCH                       26.0 - 34.0 pg 32.9   MCHC                     32.0 - 36.0 g/dL 32.0   RDW                       11.5 - 15.5 % 15.8 High    PLATELET COUNT           140 - 440 thou/cu mm 203   MPV                       6.5 - 11.0 fL 9.9   NRBC                     % 0.0   ABS NRBC thou /cu mm 0.0       ASSESSMENT/PLAN:  CHF. Acute on chronic. Diuresed in the hospital, continuing on PO lasix. Also on isosorbide. Monitor weights, edema, clinical status. Follow up in CHF clinic in October. Stable.   Chronic resp failure. CHF plus pulm HTN, ASHLIE and restrictive lung disease. On home oxygen up to 6 LPM, continue. No acute respiratory issues.   Afib. Anticoagulated with coumadin. Monitor and adjust per INR. On amiodarone and metoprolol for rate control.  Odontoid fracture. Sustained in a fall approx 8/15/2023, seen by neurosurgery, non operative. Follow up in neurosurgeon office. Continue Aspen collar.   HTN. On metoprolol and lasix. Monitor Bps in TCU.        Electronically signed by: Millie Mejia MD

## 2023-09-25 NOTE — LETTER
9/25/2023        RE: Zachery Jimenez  [address] [facility] [address]        [facility] GERIATRICS DISCHARGE SUMMARY  PATIENT'S NAME: Zachery Jimenez  YOB: 1934  MEDICAL RECORD NUMBER:  [id number]  Place of Service where encounter took place:  [facility] ([facility]) [[id number]]    PRIMARY CARE PROVIDER AND CLINIC RESPONSIBLE AFTER TRANSFER:   Provider Outside, No address on file     Transferring providers: Cecy Riddle CNP, Millie Mejia MD,   Recent Hospitalization/ED:  [facility]  stay 8/24/23 to 8/30/23.  Date of SNF Admission: August 30, 2023  Date of SNF (anticipated) Discharge: September 27, 2023  Discharged to: previous independent home  Cognitive Scores:  n/a  Physical Function: Ambulating  DME: Wheelchair and Home Oxygen    Due to diagnosis of CHF, pulmonary htn my patient will require and benefit from a manual 20 inch x 18inch wheelchair with a pair of standard foot rests, a pair of full-length armrests, and an 20x18 inch comfort curve cushion for lifetime use.      Patient has mobility limitation that significantly impairs her ability to participate in mobility related and ADLs such as food prep, dressing, transfers and ambulation.  This limitation cannot be sufficiently and safely resolved by the use of a cane, walker or crutches due to weakness related to CHF and pulmonary htn.  Patient and caregiver are able to safely use a wheelchair and mobility deficit can be resolved with its use.  Patient's home provides adequate access and maneuvering space.  Patient will use a wheelchair daily and has not expressed an unwillingness to use it within her home.    Discharge date: 9/27/23     CODE STATUS/ADVANCE DIRECTIVES DISCUSSION:  No Order DNR  ALLERGIES: Beta adrenergic blockers    NURSING FACILITY COURSE   Medication Changes/Rationale:   None    Summary of nursing facility stay:    Overall, pt feels ready to return home with  family. He has a strong support network with his children helping him. He continues on 5L home O2 and reports improvement with walking with PT. Denies SOB, CP. He shows no signs of fluid overload, has a good appetite and normal bowel regimen. Mild pain around neck managed with Tylenol, continues to wear Aspen collar.     (S12.100S) Closed odontoid fracture, sequela  (primary encounter diagnosis)  (W19.XXXS) Fall, sequela  Comment: Fracture sustained from fall on 8/15/23, non operative per neurosurgery.   Plan: Continue Ogden collar. Scheduled tylenol for pain management. F/u w/ neurosurgery.     (I50.32) Chronic diastolic CHF (congestive heart failure) (H)  Comment: Weight up 10 lbs since hospital discharge. Possible over-diuresis inpatient, patient appears to be back to dry weight. Currently 189 lbs, weights btw 184-189 in TCU. No evidence of fluid overload, no increased SOB or HORN, no LE edema. Pt feels well when walking with therapy and states his activity tolerance has improved.    Plan: Continue isosorbide and PO lasix 10 mg BID. Monitor weights at home. CHF clinic f/u in October.     (J96.11) Chronic respiratory failure with hypoxia (H)  Comment: CHF plus pulm HTN, ASHLIE and restrictive lung disease. Denies increased SOB.   Plan: Continue home O2, on 5 LPM.     (I48.0) Paroxysmal atrial fibrillation (H)  Comment: Pt reports his daughter will be able to take him to clinic for outpatient INR checks.   Plan: Continue coumadin and routine INR checks. Continue amiodarone and metoprolol for rate control.     (I10) Hypertension, unspecified type  Comment: SBPs <160.   Plan: Continue metoprolol & lasix.     (F41.9) Anxiety  Comment: Pt reports anxiety has been well controlled.   Plan: Continue sertraline     Discharge Medications:  MED REC REQUIRED  Post Medication Reconciliation Status:  Discharge medications reconciled, continue medications without change    Current Outpatient Medications   Medication Sig Dispense  "Refill     acetaminophen (TYLENOL) 500 MG tablet Take 1,000 mg by mouth every 8 hours       amiodarone (PACERONE) 200 MG tablet Take 200 mg by mouth daily       atorvastatin (LIPITOR) 20 MG tablet Take 20 mg by mouth daily       docusate sodium (COLACE) 100 MG capsule Take 100 mg by mouth daily       finasteride (PROSCAR) 5 MG tablet Take 5 mg by mouth daily       furosemide (LASIX) 20 MG tablet Take 10 mg by mouth 2 times daily       isosorbide mononitrate (IMDUR) 30 MG 24 hr tablet Take 30 mg by mouth daily       Lidocaine (LIDOCARE) 4 % Patch Apply to intact skin to cover most painful area for max 12hr per 24hr period.       methimazole (TAPAZOLE) 5 MG tablet Take 1 tablet by mouth daily       metoprolol succinate ER (TOPROL XL) 50 MG 24 hr tablet Take 25 mg by mouth daily       multiple vitamin  tablet TABS Take 1 tablet by mouth 2 times daily       nitroGLYcerin (NITROSTAT) 0.4 MG sublingual tablet Place 0.4 mg under the tongue       polyethylene glycol (MIRALAX) 17 g packet 17 g by Nasogastric route       sertraline (ZOLOFT) 50 MG tablet Take 1 tablet by mouth daily       warfarin ANTICOAGULANT (COUMADIN) 2.5 MG tablet         Controlled medications:   not applicable/none     Past Medical History:   Past Medical History:   Diagnosis Date     Afib (H)      Anxiety      Congestive heart failure (H)      Hyperlipidemia      Hypertension      Peripheral autonomic neuropathy in disorders classified elsewhere      Physical Exam:   Vitals: BP (!) 151/76   Pulse 63   Temp 98.5  F (36.9  C)   Resp 18   Ht 1.803 m (5' 11\")   Wt 85.9 kg (189 lb 4.8 oz)   SpO2 92%   BMI 26.40 kg/m    BMI: Body mass index is 26.4 kg/m .      General appearance: Alert, calm, cooperative.   Resp: Lungs clear to auscultation, unlabored, no wheezing.  CV: Regular rate and rhythm   Abdomen: Normal bowel sounds, soft, non tender.   Extremities: No cyanosis or edema.   Skin: Eraser sized pink discoloration between 4th and 5th right toes, " intact.  Neuro: Oriented. No focal deficits.   Psych: Pleasant, calm affect.      SNF labs: Recent labs in EPIC reviewed by me today.     DISCHARGE PLAN:  Follow up labs: INR due by 5-7 days, and patient instructed to go to outpatient clinic for lab draw   Medical Follow Up:      Follow up with primary care provider in 1-2 weeks  [facility] scheduled appointments: n/a. Has CHF clinic appointment in early October.   Discharge Services: Home Care:  Occupational Therapy, Physical Therapy, Registered Nurse, and Home Health Aide  Discharge Instructions Verbalized to Patient at Discharge:   Weigh yourself daily in the morning and keep a record. Call your primary clinic: a) if you are more short of breath, or b) if your weight changes more than 3 pounds in one day or more than 5 pounds in one week.   Oxygen at 5-6 liters/minute via nasal cannula.     TOTAL DISCHARGE TIME:   Greater than 30 minutes    Joyce Sosa, MSN, RN, DNP Student  [facility]    Electronically signed by:  Cecy Riddle CNP     Documentation of Face to Face and Certification for Home Health Services    I certify that patient: Zachery is under my care and that I, or a nurse practitioner or physician's assistant working with me, had a face-to-face encounter that meets the physician face-to-face encounter requirements with this patient on: 9/25/23.    This encounter with the patient was in whole, or in part, for the following medical condition, which is the primary reason for home health care: CHF, pulmonary htn    I certify that, based on my findings, the following services are medically necessary home health services: Nursing, Occupational Therapy, and Physical Therapy.    My clinical findings support the need for the above services because: RN required for medication management, PT and OT required for continued functional improvment in home setting .     Further, I certify that my clinical findings support that this patient is  homebound (i.e. absences from home require considerable and taxing effort and are for medical reasons or [employer] services or infrequently or of short duration when for other reasons) because: Requires assistance of another person or specialized equipment to access medical services because patient: Is unable to walk greater than 200 feet without rest. and Requires supervision of another for safe transfer...    Based on the above findings. I certify that this patient is confined to the home and needs intermittent skilled nursing care, physical therapy and/or speech therapy.  The patient is under my care, and I have initiated the establishment of the plan of care.  This patient will be followed by a physician who will periodically review the plan of care.                    Sincerely,        Cecy Riddle, CNP